# Patient Record
Sex: FEMALE | Race: WHITE | HISPANIC OR LATINO | Employment: UNEMPLOYED | URBAN - METROPOLITAN AREA
[De-identification: names, ages, dates, MRNs, and addresses within clinical notes are randomized per-mention and may not be internally consistent; named-entity substitution may affect disease eponyms.]

---

## 2023-11-06 ENCOUNTER — INITIAL PRENATAL (OUTPATIENT)
Dept: OBSTETRICS AND GYNECOLOGY | Facility: CLINIC | Age: 28
End: 2023-11-06
Payer: MEDICAID

## 2023-11-06 ENCOUNTER — LAB (OUTPATIENT)
Dept: LAB | Facility: LAB | Age: 28
End: 2023-11-06
Payer: MEDICAID

## 2023-11-06 VITALS — DIASTOLIC BLOOD PRESSURE: 66 MMHG | WEIGHT: 134.5 LBS | SYSTOLIC BLOOD PRESSURE: 110 MMHG

## 2023-11-06 DIAGNOSIS — Z3A.31 31 WEEKS GESTATION OF PREGNANCY (HHS-HCC): Primary | ICD-10-CM

## 2023-11-06 DIAGNOSIS — O99.013 ANEMIA DURING PREGNANCY IN THIRD TRIMESTER (HHS-HCC): ICD-10-CM

## 2023-11-06 DIAGNOSIS — O35.BXX0 ANOMALY OF HEART OF FETUS AFFECTING PREGNANCY, ANTEPARTUM, SINGLE OR UNSPECIFIED FETUS (HHS-HCC): ICD-10-CM

## 2023-11-06 DIAGNOSIS — O35.BXX1 ABNORMAL FETAL ECHOCARDIOGRAPHY AFFECTING ANTEPARTUM CARE OF MOTHER, FETUS 1 OF MULTIPLE GESTATION (HHS-HCC): ICD-10-CM

## 2023-11-06 DIAGNOSIS — K62.3 RECTAL PROLAPSE: ICD-10-CM

## 2023-11-06 PROBLEM — O09.91 SUPERVISION OF HIGH RISK PREGNANCY IN FIRST TRIMESTER (HHS-HCC): Status: ACTIVE | Noted: 2023-05-17

## 2023-11-06 PROBLEM — O09.91 SUPERVISION OF HIGH RISK PREGNANCY IN FIRST TRIMESTER (HHS-HCC): Status: RESOLVED | Noted: 2023-05-17 | Resolved: 2023-11-06

## 2023-11-06 LAB
ERYTHROCYTE [DISTWIDTH] IN BLOOD BY AUTOMATED COUNT: 13.4 % (ref 11.5–14.5)
HCT VFR BLD AUTO: 31.1 % (ref 36–46)
HGB BLD-MCNC: 10.6 G/DL (ref 12–16)
MCH RBC QN AUTO: 30.7 PG (ref 26–34)
MCHC RBC AUTO-ENTMCNC: 34.1 G/DL (ref 32–36)
MCV RBC AUTO: 90 FL (ref 80–100)
NRBC BLD-RTO: 0 /100 WBCS (ref 0–0)
PLATELET # BLD AUTO: 349 X10*3/UL (ref 150–450)
RBC # BLD AUTO: 3.45 X10*6/UL (ref 4–5.2)
REFLEX ADDED, ANEMIA PANEL: NORMAL
WBC # BLD AUTO: 9.7 X10*3/UL (ref 4.4–11.3)

## 2023-11-06 PROCEDURE — 90471 IMMUNIZATION ADMIN: CPT | Mod: GC | Performed by: STUDENT IN AN ORGANIZED HEALTH CARE EDUCATION/TRAINING PROGRAM

## 2023-11-06 PROCEDURE — 90715 TDAP VACCINE 7 YRS/> IM: CPT | Mod: GC | Performed by: STUDENT IN AN ORGANIZED HEALTH CARE EDUCATION/TRAINING PROGRAM

## 2023-11-06 PROCEDURE — 99204 OFFICE O/P NEW MOD 45 MIN: CPT | Performed by: STUDENT IN AN ORGANIZED HEALTH CARE EDUCATION/TRAINING PROGRAM

## 2023-11-06 PROCEDURE — 82728 ASSAY OF FERRITIN: CPT

## 2023-11-06 PROCEDURE — 83550 IRON BINDING TEST: CPT

## 2023-11-06 PROCEDURE — 36415 COLL VENOUS BLD VENIPUNCTURE: CPT

## 2023-11-06 PROCEDURE — 82746 ASSAY OF FOLIC ACID SERUM: CPT

## 2023-11-06 PROCEDURE — 85027 COMPLETE CBC AUTOMATED: CPT

## 2023-11-06 PROCEDURE — 99214 OFFICE O/P EST MOD 30 MIN: CPT | Mod: GC | Performed by: STUDENT IN AN ORGANIZED HEALTH CARE EDUCATION/TRAINING PROGRAM

## 2023-11-06 PROCEDURE — 82607 VITAMIN B-12: CPT

## 2023-11-06 ASSESSMENT — EDINBURGH POSTNATAL DEPRESSION SCALE (EPDS)
I HAVE FELT SAD OR MISERABLE: NO, NOT AT ALL
THINGS HAVE BEEN GETTING ON TOP OF ME: NO, I HAVE BEEN COPING AS WELL AS EVER
I HAVE BEEN ABLE TO LAUGH AND SEE THE FUNNY SIDE OF THINGS: AS MUCH AS I ALWAYS COULD
I HAVE BEEN SO UNHAPPY THAT I HAVE HAD DIFFICULTY SLEEPING: NOT AT ALL
I HAVE FELT SCARED OR PANICKY FOR NO GOOD REASON: NO, NOT AT ALL
THE THOUGHT OF HARMING MYSELF HAS OCCURRED TO ME: NEVER
I HAVE LOOKED FORWARD WITH ENJOYMENT TO THINGS: AS MUCH AS I EVER DID
TOTAL SCORE: 0
I HAVE BEEN SO UNHAPPY THAT I HAVE BEEN CRYING: NO, NEVER
I HAVE BLAMED MYSELF UNNECESSARILY WHEN THINGS WENT WRONG: NO, NEVER
I HAVE BEEN ANXIOUS OR WORRIED FOR NO GOOD REASON: NO, NOT AT ALL

## 2023-11-06 NOTE — PROGRESS NOTES
OB Follow-up  2023       SUBJECTIVE    HPI: Criss Roman is a 28 y.o.  at 29w6d here for RPNV. Denies contractions, bleeding, or LOF. Reports normal fetal movement. Patient reports feeling well. No complaints. Transfer of care from [facility].    OBJECTIVE    Visit Vitals  /66 Comment:    Wt 61 kg (134 lb 8 oz)   LMP 2023 (Exact Date)   OB Status Pregnant   Smoking Status Never      FHT: 139    ASSESSMENT & PLAN    Criss Romna is a 28 y.o.  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  - s/p fetal echo  - Triage code 0, no additional testing needed, no code pink at time of delivery    Anemia during pregnancy in third trimester  - hgb 10.4 on 10/11  - repeat cbc with anemia panel sent today     31 weeks gestation of pregnancy  - transfer of care from [facility]  - Doing well, no concerns  - TDaP today     Orders Placed This Encounter   Procedures    Tdap vaccine, age 7 years and older  (BOOSTRIX)    CBC Anemia Panel With Reflex, Pregnancy     Standing Status:   Future     Number of Occurrences:   1     Standing Expiration Date:   2024     Order Specific Question:   Release result to [employer] [facility]     Answer:   Immediate [1]      RTC in 2 weeks    Patient seen and evaluated with Dr. Ledy Chen MD, PGY-3

## 2023-11-06 NOTE — ASSESSMENT & PLAN NOTE
- transfer of care from [facility]  - Doing well, no concerns  - TDaP today

## 2023-11-06 NOTE — ASSESSMENT & PLAN NOTE
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

## 2023-11-06 NOTE — ASSESSMENT & PLAN NOTE
- pt reports history of rectal prolapse with subsequent surgery in [place]  - reports doctor recommended not to attempt a vaginal birth

## 2023-11-07 LAB
FERRITIN SERPL-MCNC: 18 NG/ML
FOLATE SERPL-MCNC: 19.3 NG/ML
IRON SATN MFR SERPL: NORMAL %
IRON SERPL-MCNC: 33 UG/DL
TIBC SERPL-MCNC: NORMAL UG/DL
UIBC SERPL-MCNC: >450 UG/DL
VIT B12 SERPL-MCNC: 222 PG/ML

## 2023-11-07 RX ORDER — FERROUS SULFATE 325(65) MG
65 TABLET ORAL
Qty: 30 TABLET | Refills: 11 | Status: SHIPPED | OUTPATIENT
Start: 2023-11-07 | End: 2024-01-19 | Stop reason: ALTCHOICE

## 2023-11-14 ENCOUNTER — ANCILLARY PROCEDURE (OUTPATIENT)
Dept: RADIOLOGY | Facility: CLINIC | Age: 28
End: 2023-11-14
Payer: MEDICAID

## 2023-11-14 DIAGNOSIS — Z36.82 ENCOUNTER FOR ANTENATAL SCREENING FOR NUCHAL TRANSLUCENCY (HHS-HCC): ICD-10-CM

## 2023-11-14 DIAGNOSIS — O35.BXX0: ICD-10-CM

## 2023-11-14 PROCEDURE — 76819 FETAL BIOPHYS PROFIL W/O NST: CPT | Performed by: OBSTETRICS & GYNECOLOGY

## 2023-11-14 PROCEDURE — 76811 OB US DETAILED SNGL FETUS: CPT | Performed by: OBSTETRICS & GYNECOLOGY

## 2023-11-14 PROCEDURE — 76811 OB US DETAILED SNGL FETUS: CPT

## 2023-11-14 PROCEDURE — 76819 FETAL BIOPHYS PROFIL W/O NST: CPT

## 2023-12-04 ENCOUNTER — TELEPHONE (OUTPATIENT)
Dept: OBSTETRICS AND GYNECOLOGY | Facility: CLINIC | Age: 28
End: 2023-12-04

## 2023-12-04 ENCOUNTER — PHARMACY VISIT (OUTPATIENT)
Dept: PHARMACY | Facility: CLINIC | Age: 28
End: 2023-12-04
Payer: MEDICAID

## 2023-12-04 ENCOUNTER — ROUTINE PRENATAL (OUTPATIENT)
Dept: OBSTETRICS AND GYNECOLOGY | Facility: CLINIC | Age: 28
End: 2023-12-04
Payer: MEDICAID

## 2023-12-04 ENCOUNTER — PREP FOR PROCEDURE (OUTPATIENT)
Dept: OBSTETRICS AND GYNECOLOGY | Facility: CLINIC | Age: 28
End: 2023-12-04

## 2023-12-04 ENCOUNTER — LAB (OUTPATIENT)
Dept: LAB | Facility: LAB | Age: 28
End: 2023-12-04
Payer: MEDICAID

## 2023-12-04 VITALS — HEART RATE: 108 BPM | DIASTOLIC BLOOD PRESSURE: 54 MMHG | WEIGHT: 142 LBS | SYSTOLIC BLOOD PRESSURE: 97 MMHG

## 2023-12-04 DIAGNOSIS — Z3A.33 33 WEEKS GESTATION OF PREGNANCY (HHS-HCC): ICD-10-CM

## 2023-12-04 DIAGNOSIS — O99.013 ANEMIA DURING PREGNANCY IN THIRD TRIMESTER (HHS-HCC): ICD-10-CM

## 2023-12-04 DIAGNOSIS — Z3A.34 34 WEEKS GESTATION OF PREGNANCY (HHS-HCC): ICD-10-CM

## 2023-12-04 DIAGNOSIS — K62.3 RECTAL PROLAPSE: ICD-10-CM

## 2023-12-04 DIAGNOSIS — O35.BXX0 ABNORMAL FETAL ECHOCARDIOGRAPHY AFFECTING ANTEPARTUM CARE OF MOTHER, SINGLE OR UNSPECIFIED FETUS (HHS-HCC): Primary | ICD-10-CM

## 2023-12-04 DIAGNOSIS — Z3A.39 39 WEEKS GESTATION OF PREGNANCY (HHS-HCC): Primary | ICD-10-CM

## 2023-12-04 DIAGNOSIS — G47.00 INSOMNIA, UNSPECIFIED TYPE: ICD-10-CM

## 2023-12-04 LAB
ERYTHROCYTE [DISTWIDTH] IN BLOOD BY AUTOMATED COUNT: 14.9 % (ref 11.5–14.5)
HCT VFR BLD AUTO: 32.2 % (ref 36–46)
HGB BLD-MCNC: 11.3 G/DL (ref 12–16)
HGB RETIC QN: 33 PG (ref 28–38)
IMMATURE RETIC FRACTION: 22.3 %
MCH RBC QN AUTO: 31.8 PG (ref 26–34)
MCHC RBC AUTO-ENTMCNC: 35.1 G/DL (ref 32–36)
MCV RBC AUTO: 91 FL (ref 80–100)
NRBC BLD-RTO: 0 /100 WBCS (ref 0–0)
PLATELET # BLD AUTO: 303 X10*3/UL (ref 150–450)
RBC # BLD AUTO: 3.55 X10*6/UL (ref 4–5.2)
RETICS #: 0.12 X10*6/UL (ref 0.02–0.08)
RETICS/RBC NFR AUTO: 3.4 % (ref 0.5–2)
T PALLIDUM AB SER QL: NONREACTIVE
WBC # BLD AUTO: 7 X10*3/UL (ref 4.4–11.3)

## 2023-12-04 PROCEDURE — 99214 OFFICE O/P EST MOD 30 MIN: CPT | Mod: GC,TH | Performed by: STUDENT IN AN ORGANIZED HEALTH CARE EDUCATION/TRAINING PROGRAM

## 2023-12-04 PROCEDURE — 36415 COLL VENOUS BLD VENIPUNCTURE: CPT

## 2023-12-04 PROCEDURE — RXMED WILLOW AMBULATORY MEDICATION CHARGE

## 2023-12-04 PROCEDURE — 99214 OFFICE O/P EST MOD 30 MIN: CPT | Performed by: STUDENT IN AN ORGANIZED HEALTH CARE EDUCATION/TRAINING PROGRAM

## 2023-12-04 PROCEDURE — 85045 AUTOMATED RETICULOCYTE COUNT: CPT

## 2023-12-04 PROCEDURE — 85027 COMPLETE CBC AUTOMATED: CPT

## 2023-12-04 PROCEDURE — 86780 TREPONEMA PALLIDUM: CPT

## 2023-12-04 RX ORDER — DIPHENHYDRAMINE HCL 25 MG
25 TABLET ORAL NIGHTLY PRN
Qty: 30 TABLET | Refills: 0 | Status: SHIPPED | OUTPATIENT
Start: 2023-12-04 | End: 2024-01-19 | Stop reason: ALTCHOICE

## 2023-12-04 RX ORDER — DIPHENHYDRAMINE HCL 25 MG
25 TABLET ORAL NIGHTLY PRN
Qty: 30 TABLET | Refills: 0 | Status: SHIPPED | OUTPATIENT
Start: 2023-12-04 | End: 2023-12-04 | Stop reason: SDUPTHER

## 2023-12-04 ASSESSMENT — PAIN - FUNCTIONAL ASSESSMENT: PAIN_FUNCTIONAL_ASSESSMENT: 0-10

## 2023-12-04 ASSESSMENT — PAIN SCALES - GENERAL: PAINLEVEL_OUTOF10: 0 - NO PAIN

## 2023-12-04 NOTE — TELEPHONE ENCOUNTER
C/Section per Dr Hutchinson at 39 weeks GA  Scheduled for 1/9/2024 0830  Arrive 0630  Patient notified  Discussed labs, NPO after midnight, visitor policy   Dr Hutchinson to put in order

## 2023-12-04 NOTE — TELEPHONE ENCOUNTER
----- Message from Ute Hutchinson MD sent at 2023 10:59 AM EST -----  Hi,    This patient needs to be scheduled for a 39 week repeat  and wants a breast pump prescription     Thank you!  Ute

## 2023-12-04 NOTE — PROGRESS NOTES
Ob Follow-up  23        SUBJECTIVE      HPI: Criss Roman is a 28 y.o.  at 33w6d here for RPNV.  She has no contractions, bleeding, or LOF. Reports normal fetal movement.        OBJECTIVE  Visit Vitals  BP 97/54   Pulse 108   Wt 64.4 kg (142 lb)   LMP 2023 (Exact Date)   OB Status Pregnant   Smoking Status Never      FHT: 143      ASSESSMENT & PLAN    Criss Roman is a 28 y.o.  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  - s/p fetal echo  - Triage code 0, no additional testing needed, no code pink at time of delivery         Anemia during pregnancy in third trimester    Overview     - : hgb 10.6, ferritin 18  - PO iron         Relevant Orders    CBC (Completed)    Reticulocytes (Completed)    Rectal prolapse    Overview     - pt reports history of rectal prolapse with subsequent surgery in [place]  - reports doctor recommended not to attempt a vaginal birth          Other Visit Diagnoses       Insomnia, unspecified type        Relevant Medications    diphenhydrAMINE (Sominex) 25 mg tablet              Orders Placed This Encounter   Procedures    CBC     Standing Status:   Future     Number of Occurrences:   1     Standing Expiration Date:   2024     Order Specific Question:   Release result to [employer]     Answer:   Immediate [1]    Reticulocytes     Standing Status:   Future     Number of Occurrences:   1      Standing Expiration Date:   12/4/2024     Order Specific Question:   Release result to MyChart     Answer:   Immediate [1]    Syphilis Screen with Reflex     Standing Status:   Future     Number of Occurrences:   1     Standing Expiration Date:   12/4/2024     Order Specific Question:   Release result to MyChart     Answer:   Immediate [1]        RTC in 2 weeks    Pt dw Dr Ledy Hutchinson MD

## 2023-12-19 ENCOUNTER — PHARMACY VISIT (OUTPATIENT)
Dept: PHARMACY | Facility: CLINIC | Age: 28
End: 2023-12-19
Payer: MEDICAID

## 2023-12-19 ENCOUNTER — ANCILLARY PROCEDURE (OUTPATIENT)
Dept: RADIOLOGY | Facility: CLINIC | Age: 28
End: 2023-12-19
Payer: MEDICAID

## 2023-12-19 ENCOUNTER — ROUTINE PRENATAL (OUTPATIENT)
Dept: OBSTETRICS AND GYNECOLOGY | Facility: CLINIC | Age: 28
End: 2023-12-19
Payer: MEDICAID

## 2023-12-19 VITALS — WEIGHT: 141.5 LBS | SYSTOLIC BLOOD PRESSURE: 109 MMHG | DIASTOLIC BLOOD PRESSURE: 67 MMHG

## 2023-12-19 DIAGNOSIS — O09.43 HIGH RISK MULTIGRAVIDA IN THIRD TRIMESTER (HHS-HCC): ICD-10-CM

## 2023-12-19 DIAGNOSIS — Z3A.36 36 WEEKS GESTATION OF PREGNANCY (HHS-HCC): ICD-10-CM

## 2023-12-19 DIAGNOSIS — O99.013 ANEMIA DURING PREGNANCY IN THIRD TRIMESTER (HHS-HCC): ICD-10-CM

## 2023-12-19 DIAGNOSIS — B37.31 CANDIDAL VULVITIS: ICD-10-CM

## 2023-12-19 DIAGNOSIS — K62.3 RECTAL PROLAPSE: ICD-10-CM

## 2023-12-19 DIAGNOSIS — Z03.74 ENCOUNTER FOR SUSPECTED PROBLEM WITH FETAL GROWTH RULED OUT: ICD-10-CM

## 2023-12-19 DIAGNOSIS — Z3A.36 36 WEEKS GESTATION OF PREGNANCY (HHS-HCC): Primary | ICD-10-CM

## 2023-12-19 PROCEDURE — RXMED WILLOW AMBULATORY MEDICATION CHARGE

## 2023-12-19 PROCEDURE — 76816 OB US FOLLOW-UP PER FETUS: CPT

## 2023-12-19 PROCEDURE — RXOTC WILLOW AMBULATORY OTC CHARGE

## 2023-12-19 PROCEDURE — 99214 OFFICE O/P EST MOD 30 MIN: CPT | Mod: GC,TH

## 2023-12-19 PROCEDURE — 87081 CULTURE SCREEN ONLY: CPT

## 2023-12-19 PROCEDURE — 76819 FETAL BIOPHYS PROFIL W/O NST: CPT | Performed by: OBSTETRICS & GYNECOLOGY

## 2023-12-19 PROCEDURE — 76816 OB US FOLLOW-UP PER FETUS: CPT | Performed by: OBSTETRICS & GYNECOLOGY

## 2023-12-19 PROCEDURE — 99214 OFFICE O/P EST MOD 30 MIN: CPT

## 2023-12-19 RX ORDER — NYSTATIN AND TRIAMCINOLONE ACETONIDE 100000; 1 [USP'U]/G; MG/G
OINTMENT TOPICAL 2 TIMES DAILY
Qty: 15 G | Refills: 0 | Status: SHIPPED | OUTPATIENT
Start: 2023-12-19 | End: 2023-12-25 | Stop reason: SDUPTHER

## 2023-12-19 NOTE — PROGRESS NOTES
Ob Follow-up  23     SUBJECTIVE      HPI: Criss Roman is a 28 y.o.  at 36w0d here for RPNV.  She has no contractions, no bleeding, or no LOF. Reports normal fetal movement. Patient reports a rash in her groin. Noticed it 1 week ago. Has not tried anything       OBJECTIVE  Visit Vitals  /67   Wt 64.2 kg (141 lb 8 oz)   LMP 2023 (Exact Date)   OB Status Pregnant   Smoking Status Never      FHT: US today  FH: 33   exam: inflammed vulva with erythema extending toward inguinal folds. Thin white vaginal discharge      ASSESSMENT & PLAN    Criss Roman is a 28 y.o.  at 36w0d here for the following concerns we addressed today:    Problem List Items Addressed This Visit          Pregnancy    36 weeks gestation of pregnancy - Primary    Overview     [x] datinwk US (not c/w LMP)  Baby boy Zabier  [x] Anatomic survey at 19-20w  [X] Third trimester labs (Syphilis, 1hr, CBC)  [ ] Immunizations: [ ] Flu [x] TDAP [ ] RSV [ ] Covid - declined flu/covid   [x] Contraception plan POPs  [x] feeding breast and bottle   [ ] 35w GBS - collected   [x] del plan - for pCS in s/o rectal prolapse with surgery, scheduled          Current Assessment & Plan     Up to date. GBS collected today.  Growth US today         Relevant Orders    Group B Streptococcus (GBS) Prenatal Screen, Culture    Anemia during pregnancy in third trimester    Overview     - : hgb 10.6, ferritin 18  - PO iron         Current Assessment & Plan     Doing well with PO iron.         Candidal vulvitis    Overview     Itching and redness in groin for 1 week.         Current Assessment & Plan     Vulvar inflammation, erythema and thin white vaginal discharge, consistent with yeast vulvitis  Nystatin-triamcinialone ointment Rxed         Relevant Medications    nystatin-triamcinolone (Mycolog II) ointment    High risk multigravida in third trimester    Rectal prolapse    Overview     - pt reports history of rectal prolapse  with subsequent surgery in [place]  - reports doctor recommended not to attempt a vaginal birth              Orders Placed This Encounter   Procedures    Group B Streptococcus (GBS) Prenatal Screen, Culture     If PCN-allergic, please send sensitivities     Order Specific Question:   [facility]     Answer:   Immediate [1]        RTC in 1 week  Seen and discussed with dr. Mac Anaya MD

## 2023-12-19 NOTE — ASSESSMENT & PLAN NOTE
Vulvar inflammation, erythema and thin white vaginal discharge, consistent with yeast vulvitis  Nystatin-triamcinialone ointment Rxed

## 2023-12-23 LAB — GP B STREP GENITAL QL CULT: NORMAL

## 2023-12-25 DIAGNOSIS — B37.31 CANDIDAL VULVITIS: ICD-10-CM

## 2023-12-26 RX ORDER — NYSTATIN AND TRIAMCINOLONE ACETONIDE 100000; 1 [USP'U]/G; MG/G
OINTMENT TOPICAL 2 TIMES DAILY
Qty: 15 G | Refills: 0 | Status: SHIPPED | OUTPATIENT
Start: 2023-12-26 | End: 2024-01-19 | Stop reason: ALTCHOICE

## 2023-12-28 ENCOUNTER — ROUTINE PRENATAL (OUTPATIENT)
Dept: OBSTETRICS AND GYNECOLOGY | Facility: CLINIC | Age: 28
End: 2023-12-28
Payer: MEDICAID

## 2023-12-28 VITALS — WEIGHT: 143.6 LBS | SYSTOLIC BLOOD PRESSURE: 105 MMHG | DIASTOLIC BLOOD PRESSURE: 63 MMHG

## 2023-12-28 DIAGNOSIS — Z3A.36 36 WEEKS GESTATION OF PREGNANCY (HHS-HCC): ICD-10-CM

## 2023-12-28 DIAGNOSIS — O99.013 ANEMIA DURING PREGNANCY IN THIRD TRIMESTER (HHS-HCC): ICD-10-CM

## 2023-12-28 DIAGNOSIS — K62.3 RECTAL PROLAPSE: ICD-10-CM

## 2023-12-28 DIAGNOSIS — O09.43 HIGH RISK MULTIGRAVIDA IN THIRD TRIMESTER (HHS-HCC): ICD-10-CM

## 2023-12-28 DIAGNOSIS — B37.31 CANDIDAL VULVITIS: ICD-10-CM

## 2023-12-28 PROCEDURE — 99213 OFFICE O/P EST LOW 20 MIN: CPT | Performed by: OBSTETRICS & GYNECOLOGY

## 2023-12-28 PROCEDURE — 99213 OFFICE O/P EST LOW 20 MIN: CPT | Mod: TH | Performed by: OBSTETRICS & GYNECOLOGY

## 2023-12-28 RX ORDER — PYRIDOXINE HCL (VITAMIN B6) 25 MG
25 TABLET ORAL
COMMUNITY
Start: 2023-06-15 | End: 2024-01-03 | Stop reason: HOSPADM

## 2023-12-28 NOTE — PROGRESS NOTES
"Subjective   Patient ID [id number]   Criss Roman is a 28 y.o.  at 37w2d with a working estimated date of delivery of 2024, by Ultrasound who presents for a routine prenatal visit. She denies vaginal bleeding, leakage of fluid, decreased fetal movements, or contractions.    Her pregnancy is complicated by:  Rectal prolapse    Objective   Physical Exam:   Weight: 65.1 kg (143 lb 9.6 oz)  Expected Total Weight Gain: Could not be calculated   Pregravid BMI: Could not be calculated  BP: 105/63  Fetal Heart Rate: 155 Fundal Height (cm): 36 cm             Prenatal Labs  Urine Dip:  No results found for: \"KETONESU\", \"GLUCOSEUR\", \"LEUKOCYTESUR\"  Lab Results   Component Value Date    HGB 11.3 (L) 2023    HCT 32.2 (L) 2023     No results found for: \"PAPPA\", \"AFP\", \"HCG\", \"ESTRIOL\", \"INHBA\"  No results found for: \"GLUF\", \"GLUT1\", \"YEZCNFC7XA\", \"NFLHBGX2FQ\"    Imaging  The most recent ultrasound was performed on   with a study GA of   and EFW of  .          Assessment/Plan   Diagnoses and all orders for this visit:  36 weeks gestation of pregnancy  Anemia during pregnancy in third trimester  Rectal prolapse  High risk multigravida in third trimester  Candidal vulvitis    Continue prenatal vitamin.  Labs reviewed.  GBS taken.  Expected mode of delivery C/S  Follow up in 1 week for a routine prenatal visit.  "

## 2023-12-29 ENCOUNTER — PHARMACY VISIT (OUTPATIENT)
Dept: PHARMACY | Facility: CLINIC | Age: 28
End: 2023-12-29
Payer: MEDICAID

## 2023-12-29 PROCEDURE — RXMED WILLOW AMBULATORY MEDICATION CHARGE

## 2023-12-31 ENCOUNTER — HOSPITAL ENCOUNTER (INPATIENT)
Facility: HOSPITAL | Age: 28
LOS: 3 days | Discharge: HOME | End: 2024-01-03
Attending: STUDENT IN AN ORGANIZED HEALTH CARE EDUCATION/TRAINING PROGRAM | Admitting: STUDENT IN AN ORGANIZED HEALTH CARE EDUCATION/TRAINING PROGRAM
Payer: MEDICAID

## 2023-12-31 ENCOUNTER — ANESTHESIA (OUTPATIENT)
Dept: OBSTETRICS AND GYNECOLOGY | Facility: HOSPITAL | Age: 28
End: 2023-12-31
Payer: MEDICAID

## 2023-12-31 ENCOUNTER — ANESTHESIA EVENT (OUTPATIENT)
Dept: OBSTETRICS AND GYNECOLOGY | Facility: HOSPITAL | Age: 28
End: 2023-12-31
Payer: MEDICAID

## 2023-12-31 DIAGNOSIS — K62.3 RECTAL PROLAPSE: Primary | ICD-10-CM

## 2023-12-31 DIAGNOSIS — Z30.011 ENCOUNTER FOR INITIAL PRESCRIPTION OF CONTRACEPTIVE PILLS: ICD-10-CM

## 2023-12-31 LAB
ABO GROUP (TYPE) IN BLOOD: NORMAL
ABO GROUP (TYPE) IN BLOOD: NORMAL
ALBUMIN SERPL BCP-MCNC: 3.1 G/DL (ref 3.4–5)
ANION GAP SERPL CALC-SCNC: 12 MMOL/L (ref 10–20)
ANTIBODY SCREEN: NORMAL
APTT PPP: 29 SECONDS (ref 27–38)
BLOOD EXPIRATION DATE: NORMAL
BUN SERPL-MCNC: 4 MG/DL (ref 6–23)
CALCIUM SERPL-MCNC: 8.4 MG/DL (ref 8.6–10.6)
CHLORIDE SERPL-SCNC: 103 MMOL/L (ref 98–107)
CO2 SERPL-SCNC: 26 MMOL/L (ref 21–32)
CREAT SERPL-MCNC: 0.45 MG/DL (ref 0.5–1.05)
DISPENSE STATUS: NORMAL
ERYTHROCYTE [DISTWIDTH] IN BLOOD BY AUTOMATED COUNT: 14.5 % (ref 11.5–14.5)
ERYTHROCYTE [DISTWIDTH] IN BLOOD BY AUTOMATED COUNT: 14.6 % (ref 11.5–14.5)
ERYTHROCYTE [DISTWIDTH] IN BLOOD BY AUTOMATED COUNT: 14.7 % (ref 11.5–14.5)
FIBRINOGEN PPP-MCNC: 338 MG/DL (ref 200–400)
GFR SERPL CREATININE-BSD FRML MDRD: >90 ML/MIN/1.73M*2
GLUCOSE SERPL-MCNC: 88 MG/DL (ref 74–99)
HCT VFR BLD AUTO: 28.4 % (ref 36–46)
HCT VFR BLD AUTO: 30.6 % (ref 36–46)
HCT VFR BLD AUTO: 35.3 % (ref 36–46)
HGB BLD-MCNC: 10 G/DL (ref 12–16)
HGB BLD-MCNC: 10.2 G/DL (ref 12–16)
HGB BLD-MCNC: 12.3 G/DL (ref 12–16)
INR PPP: 1.1 (ref 0.9–1.1)
MCH RBC QN AUTO: 30.3 PG (ref 26–34)
MCH RBC QN AUTO: 30.6 PG (ref 26–34)
MCH RBC QN AUTO: 31.3 PG (ref 26–34)
MCHC RBC AUTO-ENTMCNC: 33.3 G/DL (ref 32–36)
MCHC RBC AUTO-ENTMCNC: 34.8 G/DL (ref 32–36)
MCHC RBC AUTO-ENTMCNC: 35.2 G/DL (ref 32–36)
MCV RBC AUTO: 88 FL (ref 80–100)
MCV RBC AUTO: 89 FL (ref 80–100)
MCV RBC AUTO: 91 FL (ref 80–100)
NRBC BLD-RTO: 0 /100 WBCS (ref 0–0)
PHOSPHATE SERPL-MCNC: 3.1 MG/DL (ref 2.5–4.9)
PLATELET # BLD AUTO: 215 X10*3/UL (ref 150–450)
PLATELET # BLD AUTO: 266 X10*3/UL (ref 150–450)
PLATELET # BLD AUTO: 283 X10*3/UL (ref 150–450)
POTASSIUM SERPL-SCNC: 4.1 MMOL/L (ref 3.5–5.3)
PRODUCT BLOOD TYPE: 5100
PRODUCT CODE: NORMAL
PROTHROMBIN TIME: 12 SECONDS (ref 9.8–12.8)
RBC # BLD AUTO: 3.19 X10*6/UL (ref 4–5.2)
RBC # BLD AUTO: 3.37 X10*6/UL (ref 4–5.2)
RBC # BLD AUTO: 4.02 X10*6/UL (ref 4–5.2)
RH FACTOR (ANTIGEN D): NORMAL
RH FACTOR (ANTIGEN D): NORMAL
SODIUM SERPL-SCNC: 137 MMOL/L (ref 136–145)
T PALLIDUM AB SER QL: NONREACTIVE
UNIT ABO: NORMAL
UNIT NUMBER: NORMAL
UNIT RH: NORMAL
UNIT VOLUME: 350
WBC # BLD AUTO: 12.2 X10*3/UL (ref 4.4–11.3)
WBC # BLD AUTO: 13.3 X10*3/UL (ref 4.4–11.3)
WBC # BLD AUTO: 9 X10*3/UL (ref 4.4–11.3)
XM INTEP: NORMAL

## 2023-12-31 PROCEDURE — 7100000016 HC LABOR RECOVERY PER HOUR: Performed by: STUDENT IN AN ORGANIZED HEALTH CARE EDUCATION/TRAINING PROGRAM

## 2023-12-31 PROCEDURE — 59514 CESAREAN DELIVERY ONLY: CPT | Performed by: STUDENT IN AN ORGANIZED HEALTH CARE EDUCATION/TRAINING PROGRAM

## 2023-12-31 PROCEDURE — 3700000014 HC AN EPIDURAL BLOCK CHARGE: Performed by: STUDENT IN AN ORGANIZED HEALTH CARE EDUCATION/TRAINING PROGRAM

## 2023-12-31 PROCEDURE — 01961 ANES CESAREAN DELIVERY ONLY: CPT | Performed by: ANESTHESIOLOGY

## 2023-12-31 PROCEDURE — 2500000004 HC RX 250 GENERAL PHARMACY W/ HCPCS (ALT 636 FOR OP/ED): Performed by: STUDENT IN AN ORGANIZED HEALTH CARE EDUCATION/TRAINING PROGRAM

## 2023-12-31 PROCEDURE — 2500000005 HC RX 250 GENERAL PHARMACY W/O HCPCS: Performed by: STUDENT IN AN ORGANIZED HEALTH CARE EDUCATION/TRAINING PROGRAM

## 2023-12-31 PROCEDURE — P9045 ALBUMIN (HUMAN), 5%, 250 ML: HCPCS | Mod: JZ | Performed by: STUDENT IN AN ORGANIZED HEALTH CARE EDUCATION/TRAINING PROGRAM

## 2023-12-31 PROCEDURE — 2500000004 HC RX 250 GENERAL PHARMACY W/ HCPCS (ALT 636 FOR OP/ED)

## 2023-12-31 PROCEDURE — 3700000018 HC OB ANESTHESIA C-SECTION: Performed by: STUDENT IN AN ORGANIZED HEALTH CARE EDUCATION/TRAINING PROGRAM

## 2023-12-31 PROCEDURE — 1100000001 HC PRIVATE ROOM DAILY

## 2023-12-31 PROCEDURE — 85384 FIBRINOGEN ACTIVITY: CPT

## 2023-12-31 PROCEDURE — 36430 TRANSFUSION BLD/BLD COMPNT: CPT

## 2023-12-31 PROCEDURE — 36415 COLL VENOUS BLD VENIPUNCTURE: CPT | Performed by: STUDENT IN AN ORGANIZED HEALTH CARE EDUCATION/TRAINING PROGRAM

## 2023-12-31 PROCEDURE — 86780 TREPONEMA PALLIDUM: CPT | Performed by: STUDENT IN AN ORGANIZED HEALTH CARE EDUCATION/TRAINING PROGRAM

## 2023-12-31 PROCEDURE — 80069 RENAL FUNCTION PANEL: CPT | Performed by: STUDENT IN AN ORGANIZED HEALTH CARE EDUCATION/TRAINING PROGRAM

## 2023-12-31 PROCEDURE — 86901 BLOOD TYPING SEROLOGIC RH(D): CPT | Performed by: STUDENT IN AN ORGANIZED HEALTH CARE EDUCATION/TRAINING PROGRAM

## 2023-12-31 PROCEDURE — 86920 COMPATIBILITY TEST SPIN: CPT

## 2023-12-31 PROCEDURE — 36415 COLL VENOUS BLD VENIPUNCTURE: CPT

## 2023-12-31 PROCEDURE — 85610 PROTHROMBIN TIME: CPT

## 2023-12-31 PROCEDURE — 2720000007 HC OR 272 NO HCPCS: Performed by: STUDENT IN AN ORGANIZED HEALTH CARE EDUCATION/TRAINING PROGRAM

## 2023-12-31 PROCEDURE — P9016 RBC LEUKOCYTES REDUCED: HCPCS

## 2023-12-31 PROCEDURE — 85027 COMPLETE CBC AUTOMATED: CPT

## 2023-12-31 PROCEDURE — 85027 COMPLETE CBC AUTOMATED: CPT | Performed by: STUDENT IN AN ORGANIZED HEALTH CARE EDUCATION/TRAINING PROGRAM

## 2023-12-31 RX ORDER — OXYTOCIN/0.9 % SODIUM CHLORIDE 30/500 ML
60 PLASTIC BAG, INJECTION (ML) INTRAVENOUS ONCE AS NEEDED
Status: DISCONTINUED | OUTPATIENT
Start: 2023-12-31 | End: 2023-12-31

## 2023-12-31 RX ORDER — HYDROMORPHONE HYDROCHLORIDE 1 MG/ML
0.2 INJECTION, SOLUTION INTRAMUSCULAR; INTRAVENOUS; SUBCUTANEOUS EVERY 5 MIN PRN
Status: CANCELLED | OUTPATIENT
Start: 2023-12-31

## 2023-12-31 RX ORDER — SODIUM CHLORIDE, SODIUM LACTATE, POTASSIUM CHLORIDE, CALCIUM CHLORIDE 600; 310; 30; 20 MG/100ML; MG/100ML; MG/100ML; MG/100ML
125 INJECTION, SOLUTION INTRAVENOUS CONTINUOUS
Status: DISCONTINUED | OUTPATIENT
Start: 2023-12-31 | End: 2023-12-31

## 2023-12-31 RX ORDER — ADHESIVE BANDAGE
10 BANDAGE TOPICAL
Status: DISCONTINUED | OUTPATIENT
Start: 2023-12-31 | End: 2024-01-03 | Stop reason: HOSPADM

## 2023-12-31 RX ORDER — OXYTOCIN/0.9 % SODIUM CHLORIDE 30/500 ML
60 PLASTIC BAG, INJECTION (ML) INTRAVENOUS ONCE AS NEEDED
Status: DISCONTINUED | OUTPATIENT
Start: 2023-12-31 | End: 2024-01-03 | Stop reason: HOSPADM

## 2023-12-31 RX ORDER — TRANEXAMIC ACID 100 MG/ML
1000 INJECTION, SOLUTION INTRAVENOUS ONCE AS NEEDED
Status: DISCONTINUED | OUTPATIENT
Start: 2023-12-31 | End: 2023-12-31

## 2023-12-31 RX ORDER — KETOROLAC TROMETHAMINE 30 MG/ML
30 INJECTION, SOLUTION INTRAMUSCULAR; INTRAVENOUS EVERY 6 HOURS
Status: COMPLETED | OUTPATIENT
Start: 2023-12-31 | End: 2024-01-01

## 2023-12-31 RX ORDER — AZITHROMYCIN 100 MG/ML
INJECTION, POWDER, LYOPHILIZED, FOR SOLUTION INTRAVENOUS AS NEEDED
Status: DISCONTINUED | OUTPATIENT
Start: 2023-12-31 | End: 2023-12-31

## 2023-12-31 RX ORDER — DIPHENHYDRAMINE HCL 25 MG
25 CAPSULE ORAL EVERY 4 HOURS PRN
Status: DISCONTINUED | OUTPATIENT
Start: 2023-12-31 | End: 2024-01-03 | Stop reason: HOSPADM

## 2023-12-31 RX ORDER — MORPHINE SULFATE 2 MG/ML
INJECTION, SOLUTION INTRAMUSCULAR; INTRAVENOUS AS NEEDED
Status: DISCONTINUED | OUTPATIENT
Start: 2023-12-31 | End: 2023-12-31

## 2023-12-31 RX ORDER — HYDROMORPHONE HYDROCHLORIDE 1 MG/ML
0.2 INJECTION, SOLUTION INTRAMUSCULAR; INTRAVENOUS; SUBCUTANEOUS EVERY 5 MIN PRN
Status: DISCONTINUED | OUTPATIENT
Start: 2023-12-31 | End: 2024-01-03 | Stop reason: HOSPADM

## 2023-12-31 RX ORDER — MORPHINE SULFATE 0.5 MG/ML
INJECTION, SOLUTION EPIDURAL; INTRATHECAL; INTRAVENOUS AS NEEDED
Status: DISCONTINUED | OUTPATIENT
Start: 2023-12-31 | End: 2023-12-31

## 2023-12-31 RX ORDER — MISOPROSTOL 200 UG/1
800 TABLET ORAL ONCE AS NEEDED
Status: DISCONTINUED | OUTPATIENT
Start: 2023-12-31 | End: 2023-12-31

## 2023-12-31 RX ORDER — OXYTOCIN 10 [USP'U]/ML
10 INJECTION, SOLUTION INTRAMUSCULAR; INTRAVENOUS ONCE AS NEEDED
Status: DISCONTINUED | OUTPATIENT
Start: 2023-12-31 | End: 2023-12-31

## 2023-12-31 RX ORDER — LIDOCAINE 560 MG/1
1 PATCH PERCUTANEOUS; TOPICAL; TRANSDERMAL
Status: DISCONTINUED | OUTPATIENT
Start: 2023-12-31 | End: 2024-01-03 | Stop reason: HOSPADM

## 2023-12-31 RX ORDER — CEFAZOLIN 1 G/1
INJECTION, POWDER, FOR SOLUTION INTRAVENOUS AS NEEDED
Status: DISCONTINUED | OUTPATIENT
Start: 2023-12-31 | End: 2023-12-31

## 2023-12-31 RX ORDER — METOCLOPRAMIDE 10 MG/1
10 TABLET ORAL EVERY 6 HOURS PRN
Status: DISCONTINUED | OUTPATIENT
Start: 2023-12-31 | End: 2023-12-31

## 2023-12-31 RX ORDER — HYDRALAZINE HYDROCHLORIDE 20 MG/ML
5 INJECTION INTRAMUSCULAR; INTRAVENOUS ONCE AS NEEDED
Status: DISCONTINUED | OUTPATIENT
Start: 2023-12-31 | End: 2023-12-31

## 2023-12-31 RX ORDER — ONDANSETRON HYDROCHLORIDE 2 MG/ML
4 INJECTION, SOLUTION INTRAVENOUS EVERY 6 HOURS PRN
Status: DISCONTINUED | OUTPATIENT
Start: 2023-12-31 | End: 2023-12-31

## 2023-12-31 RX ORDER — PHENYLEPHRINE 10 MG/250 ML(40 MCG/ML)IN 0.9 % SOD.CHLORIDE INTRAVENOUS
CONTINUOUS PRN
Status: DISCONTINUED | OUTPATIENT
Start: 2023-12-31 | End: 2023-12-31

## 2023-12-31 RX ORDER — MEDROXYPROGESTERONE ACETATE 150 MG/ML
150 INJECTION, SUSPENSION INTRAMUSCULAR ONCE AS NEEDED
Status: DISCONTINUED | OUTPATIENT
Start: 2023-12-31 | End: 2024-01-03 | Stop reason: HOSPADM

## 2023-12-31 RX ORDER — ACETAMINOPHEN 325 MG/1
975 TABLET ORAL EVERY 6 HOURS SCHEDULED
Status: DISCONTINUED | OUTPATIENT
Start: 2023-12-31 | End: 2024-01-03 | Stop reason: HOSPADM

## 2023-12-31 RX ORDER — NALBUPHINE HYDROCHLORIDE 10 MG/ML
5 INJECTION, SOLUTION INTRAMUSCULAR; INTRAVENOUS; SUBCUTANEOUS
Status: DISPENSED | OUTPATIENT
Start: 2023-12-31 | End: 2024-01-01

## 2023-12-31 RX ORDER — LIDOCAINE HYDROCHLORIDE 10 MG/ML
30 INJECTION INFILTRATION; PERINEURAL ONCE AS NEEDED
Status: DISCONTINUED | OUTPATIENT
Start: 2023-12-31 | End: 2023-12-31

## 2023-12-31 RX ORDER — PHENYLEPHRINE HCL IN 0.9% NACL 0.4MG/10ML
SYRINGE (ML) INTRAVENOUS AS NEEDED
Status: DISCONTINUED | OUTPATIENT
Start: 2023-12-31 | End: 2023-12-31

## 2023-12-31 RX ORDER — OXYCODONE HYDROCHLORIDE 5 MG/1
10 TABLET ORAL EVERY 4 HOURS PRN
Status: DISCONTINUED | OUTPATIENT
Start: 2024-01-01 | End: 2024-01-03 | Stop reason: HOSPADM

## 2023-12-31 RX ORDER — POLYETHYLENE GLYCOL 3350 17 G/17G
17 POWDER, FOR SOLUTION ORAL 2 TIMES DAILY PRN
Status: DISCONTINUED | OUTPATIENT
Start: 2023-12-31 | End: 2024-01-03 | Stop reason: HOSPADM

## 2023-12-31 RX ORDER — METHYLERGONOVINE MALEATE 0.2 MG/ML
0.2 INJECTION INTRAVENOUS ONCE AS NEEDED
Status: DISCONTINUED | OUTPATIENT
Start: 2023-12-31 | End: 2023-12-31

## 2023-12-31 RX ORDER — LOPERAMIDE HYDROCHLORIDE 2 MG/1
4 CAPSULE ORAL EVERY 2 HOUR PRN
Status: DISCONTINUED | OUTPATIENT
Start: 2023-12-31 | End: 2023-12-31

## 2023-12-31 RX ORDER — ONDANSETRON 4 MG/1
4 TABLET, FILM COATED ORAL EVERY 6 HOURS PRN
Status: DISCONTINUED | OUTPATIENT
Start: 2023-12-31 | End: 2023-12-31

## 2023-12-31 RX ORDER — NIFEDIPINE 10 MG/1
10 CAPSULE ORAL ONCE AS NEEDED
Status: DISCONTINUED | OUTPATIENT
Start: 2023-12-31 | End: 2023-12-31

## 2023-12-31 RX ORDER — DIPHENHYDRAMINE HYDROCHLORIDE 50 MG/ML
25 INJECTION INTRAMUSCULAR; INTRAVENOUS EVERY 4 HOURS PRN
Status: DISCONTINUED | OUTPATIENT
Start: 2023-12-31 | End: 2024-01-03 | Stop reason: HOSPADM

## 2023-12-31 RX ORDER — OXYCODONE HYDROCHLORIDE 5 MG/1
5 TABLET ORAL EVERY 4 HOURS PRN
Status: DISCONTINUED | OUTPATIENT
Start: 2024-01-01 | End: 2024-01-03 | Stop reason: HOSPADM

## 2023-12-31 RX ORDER — METOCLOPRAMIDE HYDROCHLORIDE 5 MG/ML
10 INJECTION INTRAMUSCULAR; INTRAVENOUS EVERY 6 HOURS PRN
Status: DISCONTINUED | OUTPATIENT
Start: 2023-12-31 | End: 2023-12-31

## 2023-12-31 RX ORDER — TERBUTALINE SULFATE 1 MG/ML
0.25 INJECTION SUBCUTANEOUS ONCE AS NEEDED
Status: DISCONTINUED | OUTPATIENT
Start: 2023-12-31 | End: 2023-12-31

## 2023-12-31 RX ORDER — OXYTOCIN 10 [USP'U]/ML
10 INJECTION, SOLUTION INTRAMUSCULAR; INTRAVENOUS ONCE AS NEEDED
Status: DISCONTINUED | OUTPATIENT
Start: 2023-12-31 | End: 2024-01-03 | Stop reason: HOSPADM

## 2023-12-31 RX ORDER — KETOROLAC TROMETHAMINE 30 MG/ML
INJECTION, SOLUTION INTRAMUSCULAR; INTRAVENOUS AS NEEDED
Status: DISCONTINUED | OUTPATIENT
Start: 2023-12-31 | End: 2023-12-31

## 2023-12-31 RX ORDER — SIMETHICONE 80 MG
80 TABLET,CHEWABLE ORAL 4 TIMES DAILY PRN
Status: DISCONTINUED | OUTPATIENT
Start: 2023-12-31 | End: 2024-01-03 | Stop reason: HOSPADM

## 2023-12-31 RX ORDER — FENTANYL CITRATE 50 UG/ML
INJECTION, SOLUTION INTRAMUSCULAR; INTRAVENOUS AS NEEDED
Status: DISCONTINUED | OUTPATIENT
Start: 2023-12-31 | End: 2023-12-31

## 2023-12-31 RX ORDER — CARBOPROST TROMETHAMINE 250 UG/ML
250 INJECTION, SOLUTION INTRAMUSCULAR ONCE AS NEEDED
Status: DISCONTINUED | OUTPATIENT
Start: 2023-12-31 | End: 2023-12-31

## 2023-12-31 RX ORDER — FAMOTIDINE 10 MG/ML
INJECTION INTRAVENOUS AS NEEDED
Status: DISCONTINUED | OUTPATIENT
Start: 2023-12-31 | End: 2023-12-31

## 2023-12-31 RX ORDER — IBUPROFEN 600 MG/1
600 TABLET ORAL EVERY 6 HOURS
Status: DISCONTINUED | OUTPATIENT
Start: 2024-01-01 | End: 2024-01-03 | Stop reason: HOSPADM

## 2023-12-31 RX ORDER — NALOXONE HYDROCHLORIDE 0.4 MG/ML
0.1 INJECTION, SOLUTION INTRAMUSCULAR; INTRAVENOUS; SUBCUTANEOUS EVERY 5 MIN PRN
Status: DISCONTINUED | OUTPATIENT
Start: 2023-12-31 | End: 2024-01-03 | Stop reason: HOSPADM

## 2023-12-31 RX ORDER — BISACODYL 10 MG/1
10 SUPPOSITORY RECTAL DAILY PRN
Status: DISCONTINUED | OUTPATIENT
Start: 2023-12-31 | End: 2024-01-03 | Stop reason: HOSPADM

## 2023-12-31 RX ORDER — ALBUMIN HUMAN 50 G/1000ML
SOLUTION INTRAVENOUS AS NEEDED
Status: DISCONTINUED | OUTPATIENT
Start: 2023-12-31 | End: 2023-12-31

## 2023-12-31 RX ORDER — LABETALOL HYDROCHLORIDE 5 MG/ML
20 INJECTION, SOLUTION INTRAVENOUS ONCE AS NEEDED
Status: DISCONTINUED | OUTPATIENT
Start: 2023-12-31 | End: 2023-12-31

## 2023-12-31 RX ORDER — LIDOCAINE HCL/EPINEPHRINE/PF 2%-1:200K
VIAL (ML) INJECTION AS NEEDED
Status: DISCONTINUED | OUTPATIENT
Start: 2023-12-31 | End: 2023-12-31

## 2023-12-31 RX ADMIN — SODIUM CHLORIDE, POTASSIUM CHLORIDE, SODIUM LACTATE AND CALCIUM CHLORIDE 500 ML: 600; 310; 30; 20 INJECTION, SOLUTION INTRAVENOUS at 13:20

## 2023-12-31 RX ADMIN — ONDANSETRON 4 MG: 2 INJECTION INTRAMUSCULAR; INTRAVENOUS at 09:18

## 2023-12-31 RX ADMIN — ONDANSETRON 4 MG: 2 INJECTION INTRAMUSCULAR; INTRAVENOUS at 17:00

## 2023-12-31 RX ADMIN — Medication 0.78 MCG/KG/MIN: at 09:39

## 2023-12-31 RX ADMIN — DIPHENHYDRAMINE HYDROCHLORIDE 25 MG: 50 INJECTION INTRAMUSCULAR; INTRAVENOUS at 17:24

## 2023-12-31 RX ADMIN — MORPHINE SULFATE 3 MG: 0.5 INJECTION EPIDURAL; INTRATHECAL; INTRAVENOUS at 10:21

## 2023-12-31 RX ADMIN — MORPHINE SULFATE 1 MG: 2 INJECTION, SOLUTION INTRAMUSCULAR; INTRAVENOUS at 10:26

## 2023-12-31 RX ADMIN — SODIUM CHLORIDE, POTASSIUM CHLORIDE, SODIUM LACTATE AND CALCIUM CHLORIDE 125 ML/HR: 600; 310; 30; 20 INJECTION, SOLUTION INTRAVENOUS at 09:15

## 2023-12-31 RX ADMIN — CEFAZOLIN 2 G: 330 INJECTION, POWDER, FOR SOLUTION INTRAMUSCULAR; INTRAVENOUS at 10:04

## 2023-12-31 RX ADMIN — FAMOTIDINE 10 MG: 10 INJECTION, SOLUTION INTRAVENOUS at 09:18

## 2023-12-31 RX ADMIN — LIDOCAINE HYDROCHLORIDE,EPINEPHRINE BITARTRATE 5 ML: 20; .005 INJECTION, SOLUTION EPIDURAL; INFILTRATION; INTRACAUDAL; PERINEURAL at 10:40

## 2023-12-31 RX ADMIN — KETOROLAC TROMETHAMINE 30 MG: 30 INJECTION, SOLUTION INTRAMUSCULAR at 10:26

## 2023-12-31 RX ADMIN — SODIUM CHLORIDE, SODIUM LACTATE, POTASSIUM CHLORIDE, AND CALCIUM CHLORIDE: 600; 310; 30; 20 INJECTION, SOLUTION INTRAVENOUS at 09:21

## 2023-12-31 RX ADMIN — SODIUM CHLORIDE, POTASSIUM CHLORIDE, SODIUM LACTATE AND CALCIUM CHLORIDE 125 ML/HR: 600; 310; 30; 20 INJECTION, SOLUTION INTRAVENOUS at 20:06

## 2023-12-31 RX ADMIN — ACETAMINOPHEN 975 MG: 325 TABLET ORAL at 17:12

## 2023-12-31 RX ADMIN — Medication 120 MCG: at 11:32

## 2023-12-31 RX ADMIN — MORPHINE SULFATE 1 MG: 2 INJECTION, SOLUTION INTRAMUSCULAR; INTRAVENOUS at 10:31

## 2023-12-31 RX ADMIN — CEFAZOLIN 2 G: 330 INJECTION, POWDER, FOR SOLUTION INTRAMUSCULAR; INTRAVENOUS at 11:05

## 2023-12-31 RX ADMIN — Medication 120 MCG: at 11:10

## 2023-12-31 RX ADMIN — ALBUMIN (HUMAN) 250 ML: 12.5 SOLUTION INTRAVENOUS at 11:50

## 2023-12-31 RX ADMIN — FENTANYL CITRATE 100 MCG: 50 INJECTION, SOLUTION INTRAMUSCULAR; INTRAVENOUS at 09:39

## 2023-12-31 RX ADMIN — LIDOCAINE HYDROCHLORIDE,EPINEPHRINE BITARTRATE 10 ML: 20; .005 INJECTION, SOLUTION EPIDURAL; INFILTRATION; INTRACAUDAL; PERINEURAL at 09:39

## 2023-12-31 RX ADMIN — Medication 120 MCG: at 11:29

## 2023-12-31 RX ADMIN — LIDOCAINE HYDROCHLORIDE,EPINEPHRINE BITARTRATE 5 ML: 20; .005 INJECTION, SOLUTION EPIDURAL; INFILTRATION; INTRACAUDAL; PERINEURAL at 11:15

## 2023-12-31 RX ADMIN — LIDOCAINE HYDROCHLORIDE,EPINEPHRINE BITARTRATE 5 ML: 20; .005 INJECTION, SOLUTION EPIDURAL; INFILTRATION; INTRACAUDAL; PERINEURAL at 09:45

## 2023-12-31 RX ADMIN — AZITHROMYCIN MONOHYDRATE 500 MG: 500 INJECTION, POWDER, LYOPHILIZED, FOR SOLUTION INTRAVENOUS at 10:04

## 2023-12-31 RX ADMIN — Medication 160 MCG: at 11:13

## 2023-12-31 RX ADMIN — LIDOCAINE HYDROCHLORIDE,EPINEPHRINE BITARTRATE 5 ML: 20; .005 INJECTION, SOLUTION EPIDURAL; INFILTRATION; INTRACAUDAL; PERINEURAL at 09:49

## 2023-12-31 RX ADMIN — LIDOCAINE HYDROCHLORIDE,EPINEPHRINE BITARTRATE 5 ML: 20; .005 INJECTION, SOLUTION EPIDURAL; INFILTRATION; INTRACAUDAL; PERINEURAL at 11:41

## 2023-12-31 RX ADMIN — Medication 160 MCG: at 11:50

## 2023-12-31 RX ADMIN — SODIUM CHLORIDE 600 MILLI-UNITS/MIN: 9 INJECTION, SOLUTION INTRAVENOUS at 10:17

## 2023-12-31 RX ADMIN — KETOROLAC TROMETHAMINE 30 MG: 30 INJECTION, SOLUTION INTRAMUSCULAR; INTRAVENOUS at 17:12

## 2023-12-31 SDOH — HEALTH STABILITY: MENTAL HEALTH: NON-SPECIFIC ACTIVE SUICIDAL THOUGHTS (PAST 1 MONTH): NO

## 2023-12-31 SDOH — HEALTH STABILITY: MENTAL HEALTH: WERE YOU ABLE TO COMPLETE ALL THE BEHAVIORAL HEALTH SCREENINGS?: YES

## 2023-12-31 SDOH — HEALTH STABILITY: MENTAL HEALTH: SUICIDAL BEHAVIOR (LIFETIME): NO

## 2023-12-31 SDOH — SOCIAL STABILITY: SOCIAL INSECURITY: DOES ANYONE TRY TO KEEP YOU FROM HAVING/CONTACTING OTHER FRIENDS OR DOING THINGS OUTSIDE YOUR HOME?: NO

## 2023-12-31 SDOH — HEALTH STABILITY: MENTAL HEALTH: STRENGTHS (MUST CHOOSE TWO): SUPPORT FROM FAMILY;SUPPORT FROM FRIENDS

## 2023-12-31 SDOH — SOCIAL STABILITY: SOCIAL INSECURITY: VERBAL ABUSE: DENIES

## 2023-12-31 SDOH — HEALTH STABILITY: MENTAL HEALTH: WISH TO BE DEAD (PAST 1 MONTH): NO

## 2023-12-31 SDOH — SOCIAL STABILITY: SOCIAL INSECURITY: HAS ANYONE EVER THREATENED TO HURT YOUR FAMILY OR YOUR PETS?: NO

## 2023-12-31 SDOH — ECONOMIC STABILITY: HOUSING INSECURITY: DO YOU FEEL UNSAFE GOING BACK TO THE PLACE WHERE YOU ARE LIVING?: NO

## 2023-12-31 SDOH — SOCIAL STABILITY: SOCIAL INSECURITY: ARE THERE ANY APPARENT SIGNS OF INJURIES/BEHAVIORS THAT COULD BE RELATED TO ABUSE/NEGLECT?: NO

## 2023-12-31 SDOH — SOCIAL STABILITY: SOCIAL INSECURITY: PHYSICAL ABUSE: DENIES

## 2023-12-31 SDOH — HEALTH STABILITY: MENTAL HEALTH: HAVE YOU USED ANY PRESCRIPTION DRUGS OTHER THAN PRESCRIBED IN THE PAST 12 MONTHS?: NO

## 2023-12-31 SDOH — SOCIAL STABILITY: SOCIAL INSECURITY: ABUSE SCREEN: ADULT

## 2023-12-31 SDOH — SOCIAL STABILITY: SOCIAL INSECURITY: ARE YOU OR HAVE YOU BEEN THREATENED OR ABUSED PHYSICALLY, EMOTIONALLY, OR SEXUALLY BY ANYONE?: NO

## 2023-12-31 SDOH — SOCIAL STABILITY: SOCIAL INSECURITY: DO YOU FEEL ANYONE HAS EXPLOITED OR TAKEN ADVANTAGE OF YOU FINANCIALLY OR OF YOUR PERSONAL PROPERTY?: NO

## 2023-12-31 SDOH — SOCIAL STABILITY: SOCIAL INSECURITY: HAVE YOU HAD THOUGHTS OF HARMING ANYONE ELSE?: NO

## 2023-12-31 SDOH — HEALTH STABILITY: MENTAL HEALTH: HAVE YOU USED ANY SUBSTANCES (CANABIS, COCAINE, HEROIN, HALLUCINOGENS, INHALANTS, ETC.) IN THE PAST 12 MONTHS?: NO

## 2023-12-31 ASSESSMENT — ACTIVITIES OF DAILY LIVING (ADL): LACK_OF_TRANSPORTATION: NO

## 2023-12-31 ASSESSMENT — PATIENT HEALTH QUESTIONNAIRE - PHQ9
1. LITTLE INTEREST OR PLEASURE IN DOING THINGS: NOT AT ALL
SUM OF ALL RESPONSES TO PHQ9 QUESTIONS 1 & 2: 0
2. FEELING DOWN, DEPRESSED OR HOPELESS: NOT AT ALL

## 2023-12-31 ASSESSMENT — LIFESTYLE VARIABLES
AUDIT-C TOTAL SCORE: 0
HOW OFTEN DO YOU HAVE 6 OR MORE DRINKS ON ONE OCCASION: NEVER
AUDIT-C TOTAL SCORE: 0
HOW MANY STANDARD DRINKS CONTAINING ALCOHOL DO YOU HAVE ON A TYPICAL DAY: PATIENT DOES NOT DRINK
HOW OFTEN DO YOU HAVE A DRINK CONTAINING ALCOHOL: NEVER
SKIP TO QUESTIONS 9-10: 1

## 2023-12-31 ASSESSMENT — PAIN SCALES - GENERAL

## 2023-12-31 NOTE — H&P
Obstetrical Admission History and Physical     Criss Roman is a 28 y.o.  at 37.5wga by 6wk US presenting for contractions.    Chief Complaint: Contractions    Assessment/Plan      Labor  - SVE: 480/0, kervin q3-5min  - pt for pCS in s/o recent rectal prolapse surgery    Primary CS  -Admitted, consented, scanned - cephalic  -Delivery plan: Patient for primary  section in the setting of rectal prolapse surgery. The risks, benefits, complications, alternatives, expected outcomes, potential problems during recuperation and recovery, and the risks of not performing the procedure were discussed with the patient. All questions were answered.  -T&S and CBC on admission  -Epidural for pain control  -GBS neg    Fetal Well-Being  -PNLs reviewed and WNL, GBS as above  -CEFM, cat I currently    Postpartum   -PPBC: POPs    DVT PPx  -SCDs  -Ambulation if able    Seen and discussed w/ Dr. An and Dr. Ledy Ruth MD PGY-1  Obstetrics & Gynecology    Principal Problem:    Spontaneous onset of labor after 37 but before 39 completed weeks gestation with delivery by planned  section      Pregnancy Problems (from 23 to present)       Problem Noted Resolved    High risk multigravida in third trimester 2023 by Corinne A Bazella, MD No    Priority:  Medium      Candidal vulvitis 2023 by Jennifer Anaya MD No    Priority:  Medium      Overview Signed 2023  1:31 PM by Jennifer Anaya MD     Itching and redness in groin for 1 week.         36 weeks gestation of pregnancy 2023 by Yuki Chen MD No    Priority:  Medium      Overview Addendum 2023  1:17 PM by Jennifer Anaya MD     [x] datinwk US (not c/w LMP)  Baby boy Zabier  [x] Anatomic survey at 19-20w  [X] Third trimester labs (Syphilis, 1hr, CBC)  [ ] Immunizations: [ ] Flu [x] TDAP [ ] RSV [ ] Covid - declined flu/covid   [x] Contraception plan POPs  [x] feeding breast and bottle   [ ] 35w GBS -  collected 12/19  [x] del plan - for pCS in s/o rectal prolapse with surgery, scheduled 1/9         Anemia during pregnancy in third trimester 11/6/2023 by Yuki Chen MD No    Priority:  Medium      Overview Signed 11/7/2023  4:21 PM by Yuki Chen MD     - 11/6: hgb 10.6, ferritin 18  - PO iron         Rectal prolapse 11/6/2023 by Yuki Chen MD No    Priority:  Medium      Overview Signed 11/6/2023  2:41 PM by Yuki Chen MD     - pt reports history of rectal prolapse with subsequent surgery in [place]  - reports doctor recommended not to attempt a vaginal birth         Supervision of high risk pregnancy in first trimester 5/17/2023 by Yuki Chen MD 11/6/2023 by Yuki Chen MD    Overview Signed 11/6/2023  2:41 PM by Yuki Chen MD     Formatting of this note is different from the original. Dating: PATTIE:  Jan 8, 2024 based on sure LMP: Patient's last menstrual period was 04/03/2023 (exact date). Visits: 05/17/2023 6w2d: 06/15/2023 9w2d  Complains only of not getting any photos of her US.  Patient Dating changed.  Over one week difference in PATTIE.  Patient with nausea today. 07/20/2023 14w2d No complaints.  AFP next visit.  Anatomy scan ordered 08/17/2023 18w2d  No complaints.  AFP drawn today 09/21/2023 23w2d  No complaints today.  Has seen peds cardiology.  She was told nothing to do right now.  Declined amniocentesis 10/11/2023 26w1d  Glucola today.  No complaints today.   Labs: Due: Routine prenatal labs            Rh status: O Positive            Rubella status: IF non reactive (offer MMR PP)            Hemoglobin: Hemoglobin Date Value Ref Range Status 05/11/2023 13.4 12.0 - 15.0 g/dL Final            Last Pap: Last Pap Smear date (10 year lookback): 05/11/2023 [x] Syphilis: neg [x] Hepatitis  C: neg [x] Hepatitis B: neg [x] HIV: neg [] A1C: [] 1hGTT 24-28 weeks [] GBS 36 weeks Aspirin prophylaxis Patient meets either criteria below, start low dose ASA between  12w-16w [] 1 high risk indication (h/o preeclampsia, renal disease, autoimmune disease, pregestational DM, cHTN) [] 2+ moderate risk indications (nulliparity, AMA, BMI>30, 1st degree family member with preeclampsia, black race, low SES) Genetics/Ultrasound [x] Advanced Maternal Age: N/A [x] CF screen: Neg [x] Hgb electrophoresis: WNL [x] FTS: 11.5-13.6w: ordered: 06/15/2023 [] AFP 15-20w: [x] Anatomy u/s 20w: ordered 2023 [] 3rd trimester growth U/S: Immunizations [] Flu vaccine given: [] Tdap due at 28 weeks: Blood Products:  Acceptable Postpartum: [] Benefits of breast feeding discussed      Feeding plan: Breast feeding [] Postpartum contraception plan:               Seen and discussed w/ Dr. Ledy Ruth MD PGY-1  Obstetrics & Gynecology      Subjective   Criss is here complaining of q3-5 min contractions starting yesterday morning. Good fetal movement. Denies vaginal bleeding., Denies leaking of fluid.      Patient is for primary C/S in s/o rectal prolapse surgery. States she was told not to attempt vaginal delivery.     Obstetrical History   OB History    Para Term  AB Living   2 1 1 0 0 1   SAB IAB Ectopic Multiple Live Births   0 0 0 0 0      # Outcome Date GA Lbr Ahmet/2nd Weight Sex Delivery Anes PTL Lv   2 Current            1 Term      Vag-Spont         x1    Past Medical History  Past Medical History:   Diagnosis Date    Rectal prolapse 2023        Past Surgical History   Past Surgical History:   Procedure Laterality Date    APPENDECTOMY      RECTAL SURGERY      -rectal surgery       Social History  Social History     Tobacco Use    Smoking status: Never    Smokeless tobacco: Never   Substance Use Topics    Alcohol use: Not Currently     Substance and Sexual Activity   Drug Use Never       Allergies  Penicillin and Ampicillin-sulbactam     Medications  Medications Prior to Admission   Medication Sig Dispense Refill Last Dose    diphenhydrAMINE (Sominex) 25 mg tablet  Take 1 tablet (25 mg) by mouth as needed at bedtime for sleep. 30 tablet 0     ferrous sulfate (FerrouSuL) 325 (65 Fe) MG tablet Take 1 tablet (65 mg of iron) by mouth once daily with a meal. 30 tablet 11     nystatin-triamcinolone (Mycolog II) ointment Apply topically 2 times a day. 15 g 0     PNV no.163-iron-folate no.10 20 mg iron- 1 mg tablet Take 1 tablet by mouth once daily.       pyridoxine (Vitamin B-6) 25 mg tablet Take 1 tablet (25 mg) by mouth once daily.          Objective    Last Vitals  Temp Pulse Resp BP MAP O2 Sat   36.7 °C (98.1 °F) 102 18 102/63   99 %     Physical Examination  GENERAL: Examination reveals a well developed, well nourished, gravid female in no acute distress. She is alert and cooperative.  HEENT: External ears normal. Nose normal, no erythema or discharge. Mouth and throat clear.  NECK: supple, no significant adenopathy  LUNGS: Normal respiratory effort  HEART: RRR  ABDOMEN: Gravid  VAGINA: normal appearing vagina with normal color and discharge and no lesions noted  EXTREMITIES: no limitation in range of motion  SKIN: normal coloration and turgor, no rashes  NEUROLOGICAL: alert, oriented, normal speech, no focal findings or movement disorder noted  PSYCHOLOGICAL: awake and alert; oriented to person, place, and time    Cervical Exam  Dilation: 4  Effacement (%): 80  Fetal Station: 0  Method: Manual  OB Examiner: Faraz WHITE  Fetal Assessment  Movement: Present  Mode: External US  Baseline Fetal Heart Rate (bpm): 140 bpm  Baseline Classification: Normal  Variability: Moderate (Between 6 and 25 BPM)

## 2023-12-31 NOTE — DISCHARGE INSTRUCTIONS

## 2023-12-31 NOTE — ANESTHESIA PREPROCEDURE EVALUATION
Patient: Criss Roman    Evaluation Method: In-person visit    Procedure Information       Date: 23    Procedure: OBGYN Delivery  Section    Location: [facility]    Surgeons: Laura Villafana MD          28 y.o.  at 37w5d    Relevant Problems   Anesthesia (within normal limits)      Cardiovascular (within normal limits)      Endocrine (within normal limits)      /Renal (within normal limits)      GI/Hepatic (within normal limits)      Hematology   (+) Anemia during pregnancy in third trimester      Infectious Disease   (+) Candidal vulvitis     Past Surgical History:   Procedure Laterality Date    APPENDECTOMY      RECTAL SURGERY      -rectal surgery     No anesthesia complications.  Pt is NPO after 1AM.    Clinical information reviewed:    Allergies  Meds               NPO Detail:  No data recorded     OB/GYN     Physical Exam    Airway  Mallampati: II  TM distance: >3 FB  Neck ROM: full     Cardiovascular - normal exam  Rhythm: regular  Rate: normal     Dental - normal exam     Pulmonary - normal exam  Breath sounds clear to auscultation     Abdominal          Anesthesia Plan    ASA 2     CSE     Anesthetic plan and risks discussed with patient.  Use of blood products discussed with patient who consented to blood products.    Plan discussed with attending and resident.

## 2023-12-31 NOTE — L&D DELIVERY NOTE
OB Delivery Note  2023  Criss Roman  28 y.o.     Gestational Age: 37w5d  /Para:   Quantitative Blood Loss: Admission to Discharge: 2000 mL (2023  8:25 AM - 2023  6:39 PM)    Tristan Roman [[id number]]      Labor Events    Sac identifier: Sac 1  Rupture date/time: 2023 1016  Rupture type: Artificial  Fluid color: Clear  Fluid odor: None  Labor type: Spontaneous Onset of Labor  Labor allowed to proceed with plans for an attempted vaginal birth?: No  Augmentation: None  Complications: Hemorrhage       Labor Length    3rd stage: 0h 03m       Placenta    Placenta delivery date/time: 2023 1019  Placenta removal: Manual removal  Placenta disposition: discarded       Cord    Vessels: 3 vessels  Complications: None  Delayed cord clamping?: Yes  Cord clamped date/time: 2023 1016  Cord blood disposition: Lab  Gases sent?: No  Stem cell collection (by provider): No       Anesthesia    Method: Epidural       Operative Delivery    Forceps attempted?: No  Vacuum extractor attempted?: No       Shoulder Dystocia    Shoulder dystocia present?: No        Delivery    Birth date/time: 2021 10:16:00  Delivery type: , Low Transverse   categorization: primary   priority: routine  Indications for : Other (Add Comments)  Incision type: low transverse  Complications: Hemorrhage       Resuscitation    Method: Suctioning, Tactile stimulation       Apgars    Living status: Living  Apgar Component Scores:  1 min.:  5 min.:  10 min.:  15 min.:  20 min.:    Skin color:  1  1       Heart rate:  2  2       Reflex irritability:  2  2       Muscle tone:  2  2       Respiratory effort:  2  2       Total:  9  9       Apgars assigned by: YAQUELIN AMBROSIO       Delivery Providers    Delivering clinician: Laura Villafana MD   Provider Role     Delivery Nurse     Nursery Nurse    Arti An MD Resident    Simran Ruth MD Resident                 Delivery Operative Report     Indications: Patient presented at 37.5w with spontaneous onset of labor. She had a history of 1 prior vaginal delivery, however following that delivery she had surgical repair of rectal prolapse. Her surgeon had advised against a vaginal delivery, and therefore it was recommended she proceed with  delivery.     Procedure Details   Pre op diagnosis:    1. IUP at 37.5 weeks   2. H/o rectal prolapse surgery     Post op diagnosis: Same    Findings: Normal appearing gravid uterus, fallopian tubes, and ovaries. Amniotic fluid clear, infant in cephalic presentation.     Procedure: Pt was taken to the OR where combined spinal epidural anesthesia was administered.  She was then placed in the dorsal supine position with a left lateral tilt. A harrington catheter was placed, SCD’s were applied, and a vaginal prep was performed. A pre-procedure time out was performed.  The pt was given prophylactic dose of IV antibiotics- Ancef and Azithromycin.  She was then prepped and draped in the usual sterile fashion. A Pfannenstiel skin incision was made with the scalpel through the skin and subcutaneous fat to the underlying fascial layer.  The fascia was incised in the midline with the scalpel and the incision was extended bilaterally. The superior aspect of the incision was grasped, tented up with Kocher clamps and the rectus muscle was dissected off. The muscles were divided in the midline, the peritoneum was then identified and entered bluntly and incision extended superiorly and inferiorly taking care to avoid underlying viscera. The bladder blade was placed in the pelvis.       A low transverse uterine incision was made with the scalpel, the uterine cavity was entered, and the hysterotomy was extended cephalocaudally by stretching.  The infant was delivered atraumatically, the cord was clamped and cut and infant was handed off to awaiting nursing.  A blood sample was collected.  The placenta was  then expressed.  The uterus was exteriorized and cleared of all clot and debris. The uterine incision was noted to be bleeding heavily in several areas, over which T-clamps were placed for hemostasis. The uterine incision was repaired using a running locked stitch of 2-0 PDS. A second layer of the same suture was placed in an imbricating fashion. Good hemostasis was noted.     At this point, the Harrington was noted to be draining bright red urine with several small blood clots. The hysterotomy and bladder were again closely evaluated. The hysterotomy was noted to be a safe distance from the bladder, and there were no bladder defects noted. The uterus was placed back inside the pelvis. The decision was made to back-fill the bladder with 100cc of normal saline. The bladder was again closely examined and no defects were noted. The harrington was drained.     The gutters were cleared of all clots and debris. Several large clots were evacuated from the gutters and the EBL was noted to be >1500cc, so antibiotics were redosed.     Several small serosal bleeders along the hysterotomy and rectus were cauterized. The hysterotomy was again evaluated and found to be hemostatic, the underside of the fascia and bladder and the rectus muscles were also found to be hemostatic.      The fascia was closed using a running stitch of looped 0-PDS.  During fascial closure, bright red blood was noted to be accumulating. The hysterotomy was again inspected and found to be hemostatic. The decision was made to remove the fascial stitch of 0-PDS and explore for a source of bleeding. The uterus was exteriorized. Several large clots were evacuated from the paracolic gutters. The omentum was examined and found to be hemostatic. The posterior uterus, fallopian tubes, and ovaries were hemostatic, as well as the rectus, bladder, and undersides of the fascia. The hysterotomy was evaluated again and several bleeders were noted. Multiple [id number] using  2-0 Vicryl were placed along the hysterotomy. Excellent hemostasis was then achieved.     After examining again for hemostasis, the fascia was then closed using a running stitch of looped 0-PDS. The subcutaneous layer was irrigated and small bleeders were cauterized. The skin was closed with 4-0 Vicryl.  All counts were correct, the patient tolerated the procedure well.  Dr. Villafana was present for the entire procedure.  The patient and infant were taken back to the recovery room in stable condition.    Condition:  stable    Plan:  Returned to labor and delivery room in stable condition. Received 1u pRBCs after arriving to labor and delivery room given high EBL and low-normotensive Bps. CBC, coagulation screen, and fibrinogen were immediately drawn.     Dr. Villafana was present for the entire procedure.   Arti An MD

## 2023-12-31 NOTE — ANESTHESIA PROCEDURE NOTES
CSE Block    Start time: 12/31/2023 9:20 AM  End time: 12/31/2023 9:38 AM  Reason for block: primary anesthetic, procedure for pain and post-op pain management}  Staffing  Performed: resident   Authorized by: Deacon Briones DO    Performed by: Deacon Briones DO    Preanesthetic Checklist  Completed: patient identified, IV checked, risks and benefits discussed, surgical consent, monitors and equipment checked, pre-op evaluation, timeout performed and sterile techniques followed  Block Timeout  RN/Licensed healthcare professional reads aloud to the Anesthesia provider and entire team: Patient identity, procedure with side and site, patient position, and as applicable the availability of implants/special equipment/special requirements.  Patient on coagulant treatment: no  Timeout performed at: 12/31/2023 9:25 AM    CSE Block  Patient position: right lateral decubitus  Prep: ChloraPrep  Sterility prep: cap, drape, gloves and mask  Sedation level: no sedation  Patient monitoring: continuous pulse oximetry and heart rate  Approach: midline  Local numbing: lidocaine 1% to skin and subcutaneous tissues  Vertebral space: lumbar  L3-4  Guidance: landmark technique    Epidural Needle  RAMYA technique: saline  Needle type: Tuohy   Needle gauge: 17 G  Needle length: 10 cm  Needle insertion depth: 4 cm  Epidural Catheter  Catheter type: end hole  Catheter size: 18 G  Catheter at skin depth: 9 cm  Catheter securement method: clear occlusive dressing and liquid medical adhesive    Test dose given at 12/31/2023 9:37 AM  Test dose: lidocaine 1.5% with epinephrine 1-to-200,000 (5ML)  Test dose result: no positive test dose            Assessment bilateral  Block outcome: block to be assessed in the OR  Number of attempts: 1  Events: no positive test dose  Procedure assessment: patient tolerated procedure well with no immediate complications  Additional Notes  Spinal portion aborted, epidural catheter placed after loss of resistance

## 2024-01-01 PROBLEM — B37.31 CANDIDAL VULVITIS: Status: RESOLVED | Noted: 2023-12-19 | Resolved: 2024-01-01

## 2024-01-01 PROBLEM — O35.BXX0 ABNORMAL FETAL ECHOCARDIOGRAM AFFECTING ANTEPARTUM CARE OF MOTHER (HHS-HCC): Status: RESOLVED | Noted: 2023-09-06 | Resolved: 2024-01-01

## 2024-01-01 PROBLEM — Z3A.36 36 WEEKS GESTATION OF PREGNANCY (HHS-HCC): Status: RESOLVED | Noted: 2023-11-06 | Resolved: 2024-01-01

## 2024-01-01 PROBLEM — O09.43 HIGH RISK MULTIGRAVIDA IN THIRD TRIMESTER (HHS-HCC): Status: RESOLVED | Noted: 2023-12-19 | Resolved: 2024-01-01

## 2024-01-01 LAB
ERYTHROCYTE [DISTWIDTH] IN BLOOD BY AUTOMATED COUNT: 14.6 % (ref 11.5–14.5)
HCT VFR BLD AUTO: 26 % (ref 36–46)
HGB BLD-MCNC: 9.2 G/DL (ref 12–16)
MCH RBC QN AUTO: 31.1 PG (ref 26–34)
MCHC RBC AUTO-ENTMCNC: 35.4 G/DL (ref 32–36)
MCV RBC AUTO: 88 FL (ref 80–100)
NRBC BLD-RTO: 0 /100 WBCS (ref 0–0)
PLATELET # BLD AUTO: 214 X10*3/UL (ref 150–450)
RBC # BLD AUTO: 2.96 X10*6/UL (ref 4–5.2)
WBC # BLD AUTO: 12.4 X10*3/UL (ref 4.4–11.3)

## 2024-01-01 PROCEDURE — 2500000005 HC RX 250 GENERAL PHARMACY W/O HCPCS

## 2024-01-01 PROCEDURE — 2500000001 HC RX 250 WO HCPCS SELF ADMINISTERED DRUGS (ALT 637 FOR MEDICARE OP)

## 2024-01-01 PROCEDURE — 85027 COMPLETE CBC AUTOMATED: CPT

## 2024-01-01 PROCEDURE — 1100000001 HC PRIVATE ROOM DAILY

## 2024-01-01 PROCEDURE — 36415 COLL VENOUS BLD VENIPUNCTURE: CPT

## 2024-01-01 PROCEDURE — 2500000004 HC RX 250 GENERAL PHARMACY W/ HCPCS (ALT 636 FOR OP/ED)

## 2024-01-01 RX ORDER — ONDANSETRON 4 MG/1
4 TABLET, FILM COATED ORAL EVERY 8 HOURS PRN
Status: DISCONTINUED | OUTPATIENT
Start: 2024-01-01 | End: 2024-01-03 | Stop reason: HOSPADM

## 2024-01-01 RX ORDER — ONDANSETRON HYDROCHLORIDE 2 MG/ML
4 INJECTION, SOLUTION INTRAVENOUS EVERY 8 HOURS PRN
Status: DISCONTINUED | OUTPATIENT
Start: 2024-01-01 | End: 2024-01-03 | Stop reason: HOSPADM

## 2024-01-01 RX ADMIN — LIDOCAINE 1 PATCH: 4 PATCH TOPICAL at 09:17

## 2024-01-01 RX ADMIN — KETOROLAC TROMETHAMINE 30 MG: 30 INJECTION, SOLUTION INTRAMUSCULAR; INTRAVENOUS at 00:00

## 2024-01-01 RX ADMIN — OXYCODONE HYDROCHLORIDE 10 MG: 5 TABLET ORAL at 12:13

## 2024-01-01 RX ADMIN — IBUPROFEN 600 MG: 600 TABLET, FILM COATED ORAL at 12:13

## 2024-01-01 RX ADMIN — KETOROLAC TROMETHAMINE 30 MG: 30 INJECTION, SOLUTION INTRAMUSCULAR; INTRAVENOUS at 05:49

## 2024-01-01 RX ADMIN — OXYCODONE HYDROCHLORIDE 10 MG: 5 TABLET ORAL at 21:40

## 2024-01-01 RX ADMIN — ACETAMINOPHEN 975 MG: 325 TABLET ORAL at 18:14

## 2024-01-01 RX ADMIN — IBUPROFEN 600 MG: 600 TABLET, FILM COATED ORAL at 18:14

## 2024-01-01 RX ADMIN — ACETAMINOPHEN 975 MG: 325 TABLET ORAL at 00:01

## 2024-01-01 RX ADMIN — OXYCODONE HYDROCHLORIDE 10 MG: 5 TABLET ORAL at 16:19

## 2024-01-01 RX ADMIN — ACETAMINOPHEN 975 MG: 325 TABLET ORAL at 12:13

## 2024-01-01 RX ADMIN — ACETAMINOPHEN 975 MG: 325 TABLET ORAL at 05:49

## 2024-01-01 ASSESSMENT — PAIN SCALES - GENERAL
PAINLEVEL_OUTOF10: 9
PAINLEVEL_OUTOF10: 9
PAINLEVEL_OUTOF10: 6
PAINLEVEL_OUTOF10: 8
PAINLEVEL_OUTOF10: 0 - NO PAIN
PAINLEVEL_OUTOF10: 0 - NO PAIN

## 2024-01-01 ASSESSMENT — PAIN - FUNCTIONAL ASSESSMENT
PAIN_FUNCTIONAL_ASSESSMENT: 0-10
PAIN_FUNCTIONAL_ASSESSMENT: 0-10

## 2024-01-01 ASSESSMENT — PAIN DESCRIPTION - LOCATION
LOCATION: INCISION
LOCATION: ABDOMEN

## 2024-01-01 NOTE — ANESTHESIA POSTPROCEDURE EVALUATION
Patient: Criss Roman    Procedure Summary       Date: 23 Room / Location: [facility]    Anesthesia Start: 920 Anesthesia Stop:     Procedure: OBGYN Delivery  Section Diagnosis:       Rectal prolapse      Spontaneous onset of labor after 37 but before 39 completed weeks gestation with delivery by planned  section, delivered with mention of postpartum complication      (Rectal prolapse [K62.3])      (Spontaneous onset of labor after 37 but before 39 completed weeks gestation with delivery by planned  section, delivered with mention of postpartum complication [O75.82])    Surgeons: Laura Villafana MD Responsible Provider: Rodri Ferreira MD    Anesthesia Type: CSE ASA Status: 2            Anesthesia Type: CSE    Vitals Value Taken Time   /60 23 1412   Temp 36.5 °C (97.7 °F) 23 1327   Pulse 81 23 1412   Resp 18 23 1412   SpO2 100 % 23 1412       Anesthesia Post Evaluation    Patient location during evaluation: bedside  Patient participation: complete - patient participated  Level of consciousness: awake and alert  Pain management: adequate  Airway patency: patent  Cardiovascular status: acceptable  Respiratory status: acceptable  Hydration status: acceptable  Postoperative Nausea and Vomiting: none        No notable events documented.

## 2024-01-01 NOTE — PROGRESS NOTES
Postpartum Progress Note    Assessment/Plan   Criss Roman is a 28 y.o., , who delivered at 37w5d gestation and is now postpartum day 0 from a pCS    S/P pCS on , EBL 2L  - Doing well, pain well controlled with PO meds, afebrile, tolerating diet, voiding  - Starting hgb 12.3 -> 2L EBL -> 10.2 -> 1u RBC. Will follow up 4 hour post transfusion CBC. - No signs/symptoms of anemia    Routine Postpartum  - Feeding: Would like to pump, but has not started. Continue to encourage. Lactation c/s PRN  - PPBC: POPs  - Anti-emetics PRN and bowel regimen ordered  - Continue routine postpartum care.   - DVT ppx: DVT risk score 4. Ambulation, SCDs    Dispo  - Anticipate discharge on POD#3 if continues to meet milestones  - Plan for incision check in 2 wks, postpartum visit in 4-6wks    To be Discussed with Dr. Ananya Abraham MD, PGY-1    Subjective   Her pain is well controlled with current medications  She is passing flatus  She is ambulating well  She is tolerating a Adult diet Regular    No dizziness, lightheadedness, or SOB.    Objective   Allergies:   Penicillin and Ampicillin-sulbactam         Last Vitals:  Temp Pulse Resp BP MAP Pulse Ox   37.2 °C (99 °F) 79 16 101/60   100 %     Vitals Min/Max Last 24 Hours:  Temp  Min: 36.5 °C (97.7 °F)  Max: 37.2 °C (99 °F)  Pulse  Min: 71  Max: 112  Resp  Min: 16  Max: 18  BP  Min: 87/52  Max: 111/66    Intake/Output:     Intake/Output Summary (Last 24 hours) at 2023  Last data filed at 2023 1813  Gross per 24 hour   Intake 350 ml   Output 2730 ml   Net -2380 ml       Physical Exam:  General: Examination reveals a well developed, well nourished, female, in no acute distress. She is alert and cooperative.  HEENT: PERRLA. External ears normal.  Lungs: No increased work of breathing on room air.  Cardiac: Regular rate.  Incision: Covered with bandage, no shadowing, surrounding area dry.  Fundus: firm and below umbilicus.  Extremities: Full  ROM.  Neurological: alert, oriented, normal speech, no focal findings or movement disorder noted.    Lab Data:  Lab Results   Component Value Date    WBC 13.3 (H) 12/31/2023    HGB 10.2 (L) 12/31/2023    HCT 30.6 (L) 12/31/2023     12/31/2023

## 2024-01-01 NOTE — PROGRESS NOTES
Postpartum Progress Note    Assessment/Plan     Criss Roman is a 28 y.o., , who initially presented for primary C/S in s/o rectal prolapse surgery  She had a , Low Transverse  delivery on 2023  at 37w5d and is now POD1.    #Post-op , Low Transverse   - continue routine postoperative care  - pain well controlled on ERAS protocol  - DVT Score: 4 SCDs  - The patient's blood type is O POS. The baby's blood type is A POS . Rhogam is not indicated.    #acute blood loss anemia,   - Hg  12.3  --> EBL 2L -> 1u PRBC -> 10.2 -> 10 -> POD1 9.2;   - asymptomatic, PO iron supplement on DC   - BP's low normotensive at baseline; voiding  - repeat POD2 CBC    #Contraception  We discussed the need to take the pill at exactly the same time everyday and if >2hrs late a backup method must be used for 1 week. Pt verbalized understanding.     #Maternal Well-Being  - emotional support provided  - [place] feeding: Patient is strictly formula feeding. We reviewed non-pharmacologic methods of lactation suppression and s/sx of mastitis.     #Dispo  - anticipate d/c on POD #3 if meeting all post-op and postpartum milestones  - for f/u 2wks with Primary OB provider     Principal Problem:     delivery delivered  Active Problems:    Rectal prolapse    Spontaneous onset of labor after 37 but before 39 completed weeks gestation with delivery by planned  section    Spontaneous onset of labor after 37 but before 39 completed weeks gestation with delivery by planned  section, delivered with mention of postpartum complication      Subjective   Her pain is moderately controlled with current medications  She is not passing flatus  She is ambulating independently  She is tolerating a Adult diet Regular  She is voiding spontaneously  She reports no breast or nursing problems  She denies emotional concerns today     PT seen after being given 10mg oxycodone for pain. Dressing removed.   denies  dizziness/lightheadedness/LOC/uncontrolled bleeding.     Objective   Last Vitals:  Temp Pulse Resp BP MAP Pulse Ox   36.4 °C (97.5 °F) 74 16 (!) 92/49 (15 minute recheck. RN made aware.)   94 %     Vitals Min/Max Last 24 Hours:  Temp  Min: 36.2 °C (97.2 °F)  Max: 37.2 °C (99 °F)  Pulse  Min: 69  Max: 105  Resp  Min: 16  Max: 18  BP  Min: 89/45  Max: 111/66    Intake/Output:     Intake/Output Summary (Last 24 hours) at 1/1/2024 1253  Last data filed at 1/1/2024 1213  Gross per 24 hour   Intake 1954.81 ml   Output 1505 ml   Net 449.81 ml       Physical Exam:  General: well appearing, thin, postpartum  Obstetric: fundus firm below umbilicus, lochia light  Skin: Warm, dry; no rashes/lesions/erythema; Pfannenstiel incision: clean, dry, well approximated, open to air, negative discharge/warmth/erythema   Breast: No masses, nipple discharge  Neuro: A/Ox3, no gross motor deficit   GI: no distension, appropriately tender, soft, +BS  Respiratory: Even and unlabored on RA, LSCTA BL  Cardiovascular: Trace BLE edema; No erythema, warmth  Psych: appropriate mood and affect    Lab Data:  Lab Results   Component Value Date    WBC 12.4 (H) 01/01/2024    HGB 9.2 (L) 01/01/2024    HCT 26.0 (L) 01/01/2024     01/01/2024

## 2024-01-01 NOTE — ANESTHESIA POSTPROCEDURE EVALUATION
Patient: Criss Roman    Procedure Summary       Date: 23 Room / Location: [facility]    Anesthesia Start: 920 Anesthesia Stop:     Procedure: OBGYN Delivery  Section Diagnosis:       Rectal prolapse      Spontaneous onset of labor after 37 but before 39 completed weeks gestation with delivery by planned  section, delivered with mention of postpartum complication      (Rectal prolapse [K62.3])      (Spontaneous onset of labor after 37 but before 39 completed weeks gestation with delivery by planned  section, delivered with mention of postpartum complication [O75.82])    Surgeons: Laura Villafana MD Responsible Provider: Rodri Ferreira MD    Anesthesia Type: CSE ASA Status: 2            Anesthesia Type: CSE    Vitals Value Taken Time   /60 23 1412   Temp 36.5 °C (97.7 °F) 23 1327   Pulse 81 23 1412   Resp 18 23 1412   SpO2 100 % 23 1412       Anesthesia Post Evaluation    Patient location during evaluation: floor  Patient participation: complete - patient participated  Level of consciousness: awake  Pain management: adequate  Airway patency: patent  Cardiovascular status: acceptable  Respiratory status: acceptable  Hydration status: acceptable  Postoperative Nausea and Vomiting: none  Comments: Neuraxial site assessed. No visible redness or swelling or drainage. Patient able to ambulate and move all extremities without difficulty. Able to void. No complaints of nausea/vomiting. Tolerating PO intake well. No s/sx of PDPH.      No notable events documented.

## 2024-01-02 LAB
ERYTHROCYTE [DISTWIDTH] IN BLOOD BY AUTOMATED COUNT: 15 % (ref 11.5–14.5)
HCT VFR BLD AUTO: 28.6 % (ref 36–46)
HGB BLD-MCNC: 9.8 G/DL (ref 12–16)
MCH RBC QN AUTO: 31.4 PG (ref 26–34)
MCHC RBC AUTO-ENTMCNC: 34.3 G/DL (ref 32–36)
MCV RBC AUTO: 92 FL (ref 80–100)
NRBC BLD-RTO: 0 /100 WBCS (ref 0–0)
PLATELET # BLD AUTO: 225 X10*3/UL (ref 150–450)
RBC # BLD AUTO: 3.12 X10*6/UL (ref 4–5.2)
WBC # BLD AUTO: 15 X10*3/UL (ref 4.4–11.3)

## 2024-01-02 PROCEDURE — 2500000005 HC RX 250 GENERAL PHARMACY W/O HCPCS

## 2024-01-02 PROCEDURE — 2500000001 HC RX 250 WO HCPCS SELF ADMINISTERED DRUGS (ALT 637 FOR MEDICARE OP)

## 2024-01-02 PROCEDURE — 1100000001 HC PRIVATE ROOM DAILY

## 2024-01-02 PROCEDURE — 85027 COMPLETE CBC AUTOMATED: CPT | Performed by: NURSE PRACTITIONER

## 2024-01-02 PROCEDURE — 36415 COLL VENOUS BLD VENIPUNCTURE: CPT | Performed by: NURSE PRACTITIONER

## 2024-01-02 PROCEDURE — 2500000004 HC RX 250 GENERAL PHARMACY W/ HCPCS (ALT 636 FOR OP/ED)

## 2024-01-02 RX ADMIN — LIDOCAINE 1 PATCH: 4 PATCH TOPICAL at 09:25

## 2024-01-02 RX ADMIN — ONDANSETRON HYDROCHLORIDE 4 MG: 4 TABLET, FILM COATED ORAL at 01:06

## 2024-01-02 RX ADMIN — IBUPROFEN 600 MG: 600 TABLET, FILM COATED ORAL at 06:41

## 2024-01-02 RX ADMIN — POLYETHYLENE GLYCOL 3350 17 G: 17 POWDER, FOR SOLUTION ORAL at 09:37

## 2024-01-02 RX ADMIN — ACETAMINOPHEN 975 MG: 325 TABLET ORAL at 00:32

## 2024-01-02 RX ADMIN — IBUPROFEN 600 MG: 600 TABLET, FILM COATED ORAL at 00:32

## 2024-01-02 RX ADMIN — ACETAMINOPHEN 975 MG: 325 TABLET ORAL at 11:07

## 2024-01-02 RX ADMIN — IBUPROFEN 600 MG: 600 TABLET, FILM COATED ORAL at 22:15

## 2024-01-02 RX ADMIN — ACETAMINOPHEN 975 MG: 325 TABLET ORAL at 06:41

## 2024-01-02 RX ADMIN — SIMETHICONE 80 MG: 80 TABLET, CHEWABLE ORAL at 09:37

## 2024-01-02 RX ADMIN — IBUPROFEN 600 MG: 600 TABLET, FILM COATED ORAL at 11:07

## 2024-01-02 RX ADMIN — ACETAMINOPHEN 975 MG: 325 TABLET ORAL at 22:15

## 2024-01-02 ASSESSMENT — PAIN SCALES - GENERAL
PAINLEVEL_OUTOF10: 10 - WORST POSSIBLE PAIN
PAINLEVEL_OUTOF10: 4
PAINLEVEL_OUTOF10: 6
PAINLEVEL_OUTOF10: 0 - NO PAIN
PAINLEVEL_OUTOF10: 4
PAINLEVEL_OUTOF10: 7
PAINLEVEL_OUTOF10: 10 - WORST POSSIBLE PAIN
PAINLEVEL_OUTOF10: 0 - NO PAIN

## 2024-01-02 ASSESSMENT — PAIN DESCRIPTION - DESCRIPTORS
DESCRIPTORS: SORE
DESCRIPTORS: SORE

## 2024-01-02 ASSESSMENT — PAIN DESCRIPTION - LOCATION: LOCATION: ABDOMEN

## 2024-01-02 NOTE — CARE PLAN
Problem: Postpartum  Goal: Experiences normal postpartum course  Outcome: Progressing  Goal: Appropriate maternal -  bonding  Outcome: Progressing  Goal: Establish and maintain infant feeding pattern for adequate nutrition  Outcome: Progressing  Goal: Incisions, wounds, or drain sites healing without S/S of infection  Outcome: Progressing

## 2024-01-02 NOTE — PROGRESS NOTES
Postpartum Progress Note    Assessment/Plan   Criss Roman is a 28 y.o., , who delivered at 37w5d gestation and is now postpartum day 2.    #Post-op , Low Transverse   - continue routine postoperative care  - pain well controlled on ERAS protocol  - DVT Score: 4 SCDs  - The patient's blood type is O POS. The baby's blood type is A POS . Rhogam is not indicated.     #acute blood loss anemia,   - Hg  12.3  --> EBL 2L -> 1u PRBC -> 10.2 -> 10 -> POD1 9.2;   - asymptomatic, PO iron supplement on DC   - BP's low normotensive at baseline; voiding  - repeat POD2 CBC     #Contraception  We discussed the need to take the pill at exactly the same time everyday and if >2hrs late a backup method must be used for 1 week. Pt verbalized understanding.      #Maternal Well-Being  - emotional support provided  - [place] feeding: Patient is strictly formula feeding. We reviewed non-pharmacologic methods of lactation suppression and s/sx of mastitis.      #Dispo  - anticipate d/c on POD #3 if meeting all post-op and postpartum milestones  - for f/u 2wks with Primary OB provider     Principal Problem:     delivery delivered  Active Problems:    Rectal prolapse    Spontaneous onset of labor after 37 but before 39 completed weeks gestation with delivery by planned  section    Spontaneous onset of labor after 37 but before 39 completed weeks gestation with delivery by planned  section, delivered with mention of postpartum complication    Pregnancy Problems (from 23 to present)       Problem Noted Resolved    Anemia during pregnancy in third trimester 2023 by Yuki Chen MD No    Priority:  Medium      Overview Signed 2023  4:21 PM by Yuki Chen MD     - : hgb 10.6, ferritin 18  - PO iron         Rectal prolapse 2023 by Yuki Chen MD No    Priority:  Medium      Overview Signed 2023  2:41 PM by Yuki Chen MD     - pt reports history of rectal  prolapse with subsequent surgery in [place]  - reports doctor recommended not to attempt a vaginal birth         High risk multigravida in third trimester 2023 by Corinne A Bazella, MD 2024 by MILADIS Cintron-CNP    Candidal vulvitis 2023 by Jennifer Anaya MD 2024 by MILADIS Cintron-CNP    Overview Signed 2023  1:31 PM by Jennifer Anaya MD     Itching and redness in groin for 1 week.         36 weeks gestation of pregnancy 2023 by Yuki Chen MD 2024 by MILADIS Cintron-CNP    Overview Addendum 2023  1:17 PM by Jennifer Anaya MD     [x] datinwk US (not c/w LMP)  Baby boy Oluer  [x] Anatomic survey at 19-20w  [X] Third trimester labs (Syphilis, 1hr, CBC)  [ ] Immunizations: [ ] Flu [x] TDAP [ ] RSV [ ] Covid - declined flu/covid   [x] Contraception plan POPs  [x] feeding breast and bottle   [ ] 35w GBS - collected   [x] del plan - for pCS in s/o rectal prolapse with surgery, scheduled          Supervision of high risk pregnancy in first trimester 2023 by Yuki Chen MD 2023 by Yuki Chen MD    Overview Signed 2023  2:41 PM by Yuki Chen MD     Formatting of this note is different from the original. Dating: PATTIE:  2024 based on sure LMP: Patient's last menstrual period was 2023 (exact date). Visits: 2023 6w2d: 06/15/2023 9w2d  Complains only of not getting any photos of her US.  Patient Dating changed.  Over one week difference in PATTIE.  Patient with nausea today. 2023 14w2d No complaints.  AFP next visit.  Anatomy scan ordered 2023 18w2d  No complaints.  AFP drawn today 2023 23w2d  No complaints today.  Has seen peds cardiology.  She was told nothing to do right now.  Declined amniocentesis 10/11/2023 26w1d  Glucola today.  No complaints today.   Labs: Due: Routine prenatal labs            Rh status: O Positive            Rubella status: IF non reactive (offer MMR PP)             Hemoglobin: Hemoglobin Date Value Ref Range Status 2023 13.4 12.0 - 15.0 g/dL Final            Last Pap: Last Pap Smear date (10 year lookback): 2023 [x] Syphilis: neg [x] Hepatitis  C: neg [x] Hepatitis B: neg [x] HIV: neg [] A1C: [] 1hGTT 24-28 weeks [] GBS 36 weeks Aspirin prophylaxis Patient meets either criteria below, start low dose ASA between 12w-16w [] 1 high risk indication (h/o preeclampsia, renal disease, autoimmune disease, pregestational DM, cHTN) [] 2+ moderate risk indications (nulliparity, AMA, BMI>30, 1st degree family member with preeclampsia, black race, low SES) Genetics/Ultrasound [x] Advanced Maternal Age: N/A [x] CF screen: Neg [x] Hgb electrophoresis: WNL [x] FTS: 11.5-13.6w: ordered: 06/15/2023 [] AFP 15-20w: [x] Anatomy u/s 20w: ordered 2023 [] 3rd trimester growth U/S: Immunizations [] Flu vaccine given: [] Tdap due at 28 weeks: Blood Products:  Acceptable Postpartum: [] Benefits of breast feeding discussed      Feeding plan: Breast feeding [] Postpartum contraception plan:               Hospital course: no complications   section delivery  Patient is not breastfeedingThe patient's blood type is O POS. The baby's blood type is A POS . Rhogam is not indicated.    Subjective   Her pain is well controlled with current medications  She is passing flatus  She is ambulating well  She is tolerating a Adult diet Regular  She reports no breast or nursing problems  She denies emotional concerns today   Her plan for contraception is none     Meeting all post op milestones    Objective   Allergies:   Penicillin and Ampicillin-sulbactam         Last Vitals:  Temp Pulse Resp BP MAP Pulse Ox   36.1 °C (97 °F) 63 16 92/54   96 %     Vitals Min/Max Last 24 Hours:  Temp  Min: 36.1 °C (97 °F)  Max: 36.9 °C (98.4 °F)  Pulse  Min: 63  Max: 78  Resp  Min: 16  Max: 16  BP  Min: 87/50  Max: 100/62    Intake/Output:     Intake/Output Summary (Last 24 hours) at 2024 1108  Last data  filed at 1/1/2024 6727  Gross per 24 hour   Intake --   Output 600 ml   Net -600 ml       Physical Exam:  General: Examination reveals a well developed, well nourished, female, in no acute distress. She is alert and cooperative.  Lungs: symmetrical, non-labored breathing.  Cardiac: warm, well-perfused.  Abdomen: soft, non-tender.  Fundus: firm, at umbilicus, and nontender.  Extremities: no redness or tenderness in the calves or thighs.  Neurological: alert, oriented, normal speech, no focal findings or movement disorder noted.     Lab Data:  Lab Results   Component Value Date    WBC 15.0 (H) 01/02/2024    HGB 9.8 (L) 01/02/2024    HCT 28.6 (L) 01/02/2024     01/02/2024

## 2024-01-03 VITALS
BODY MASS INDEX: 23.62 KG/M2 | DIASTOLIC BLOOD PRESSURE: 59 MMHG | HEIGHT: 65 IN | OXYGEN SATURATION: 95 % | SYSTOLIC BLOOD PRESSURE: 97 MMHG | WEIGHT: 141.76 LBS | HEART RATE: 76 BPM | RESPIRATION RATE: 16 BRPM | TEMPERATURE: 97.9 F

## 2024-01-03 LAB
BLOOD EXPIRATION DATE: NORMAL
DISPENSE STATUS: NORMAL
PRODUCT BLOOD TYPE: 5100
PRODUCT CODE: NORMAL
UNIT ABO: NORMAL
UNIT NUMBER: NORMAL
UNIT RH: NORMAL
UNIT VOLUME: 350
XM INTEP: NORMAL

## 2024-01-03 PROCEDURE — 2500000001 HC RX 250 WO HCPCS SELF ADMINISTERED DRUGS (ALT 637 FOR MEDICARE OP)

## 2024-01-03 RX ORDER — IBUPROFEN 600 MG/1
600 TABLET ORAL EVERY 6 HOURS PRN
Qty: 60 TABLET | Refills: 0 | Status: SHIPPED | OUTPATIENT
Start: 2024-01-03 | End: 2024-01-19 | Stop reason: ALTCHOICE

## 2024-01-03 RX ORDER — NORETHINDRONE 0.35 MG/1
1 TABLET ORAL DAILY
Qty: 28 TABLET | Refills: 2 | Status: SHIPPED | OUTPATIENT
Start: 2024-01-03 | End: 2024-01-19 | Stop reason: ALTCHOICE

## 2024-01-03 RX ORDER — ACETAMINOPHEN 325 MG/1
975 TABLET ORAL EVERY 6 HOURS PRN
Qty: 120 TABLET | Refills: 0 | Status: SHIPPED | OUTPATIENT
Start: 2024-01-03 | End: 2024-01-19 | Stop reason: ALTCHOICE

## 2024-01-03 RX ORDER — POLYETHYLENE GLYCOL 3350 17 G/17G
17 POWDER, FOR SOLUTION ORAL 2 TIMES DAILY PRN
Qty: 14 EACH | Refills: 0 | Status: SHIPPED | OUTPATIENT
Start: 2024-01-03 | End: 2024-01-19 | Stop reason: ALTCHOICE

## 2024-01-03 RX ADMIN — IBUPROFEN 600 MG: 600 TABLET, FILM COATED ORAL at 08:27

## 2024-01-03 RX ADMIN — ACETAMINOPHEN 975 MG: 325 TABLET ORAL at 08:27

## 2024-01-03 ASSESSMENT — PAIN SCALES - GENERAL
PAINLEVEL_OUTOF10: 8
PAINLEVEL_OUTOF10: 8

## 2024-01-03 ASSESSMENT — PAIN DESCRIPTION - DESCRIPTORS: DESCRIPTORS: SORE

## 2024-01-03 NOTE — CARE PLAN
Pt remains stable. VSS. Pain well controlled with PO pain meds, abdominal binder, and hot packs. Ambulating, passing gas, tolerating regular diet. Bonding well with infant. Fundus firm, midline, lochia moderate, incision KATHARINA, well approximated.

## 2024-01-03 NOTE — CARE PLAN
The patient's goals for the shift include get comfortable and be able to tolerate po nutrition    The clinical goals for the shift include Discharge    Patient's vss, assessment stable, pain well controlled, lochia light to scant. Patient clear for discharge

## 2024-01-03 NOTE — CARE PLAN
Patient is progressing through goals. Lochia is light, vital signs are stable, and pain is controlled.

## 2024-01-03 NOTE — DISCHARGE SUMMARY
Postpartum Discharge Summary    Admission Date: 2023  Discharge Date: 24     Discharge Diagnosis  Problem List Items Addressed This Visit       Rectal prolapse - Primary    Overview     - pt reports history of rectal prolapse with subsequent surgery in [place]  - reports doctor recommended not to attempt a vaginal birth         Relevant Orders    Case Request Operating Room: OBGYN Delivery  Section (Completed)    Spontaneous onset of labor after 37 but before 39 completed weeks gestation with delivery by planned  section, delivered with mention of postpartum complication    Relevant Orders    Case Request Operating Room: OBGYN Delivery  Section (Completed)    * (Principal)  delivery delivered    Relevant Medications    acetaminophen (Tylenol) 325 mg tablet    ibuprofen 600 mg tablet    polyethylene glycol (Glycolax, Miralax) 17 gram packet     Other Visit Diagnoses       Encounter for initial prescription of contraceptive pills        Relevant Medications    norethindrone (Micronor) 0.35 mg tablet             Criss Roman is a 28 y.o. female now  and postpartum day 3      Pregnancy notable for:  - h/o rectal prolapse surgery in [place], reports doctor recommended not to attempt a vaginal birth    Hospital Course  Admitted for PLTCS in s/o active labor and need for CS given h/o rectal prolapse surgery  Delivery Date: 2023  10:16 AM   Delivery type: , Low Transverse    GA at delivery: 37w5d  Outcome: Living   Anesthesia during delivery: Epidural   Intrapartum complications: Hemorrhage   Feeding method: Breastfeeding Status: No     Delivery complications: PPH - admission hgb 12.3 -> EBL 2 L -> 1u PRBC -> immediate post-op hgb 10.2 -> POD1 hgb 9.2  Contraception at discharge: POPs    Complications Requiring Follow-Up  none    Pertinent Subjective and Physical Exam At Time of Discharge  Patient seen at bedside - doing well and no acute events overnight. Pain  well-controlled on current regimen, lochia light, voiding spontaneously, passing flatus, ambulating independently, and tolerating PO.     General: well appearing, well-nourished, postpartum  Obstetric: abdomen soft/non-tender, fundus firm below umbilicus, lochia light, Pfannenstiel incision c/d/i  Skin: No rashes/lesions/erythema  Neuro: A/Ox3, conversational  GI: +BS, +flatus  Respiratory: Even and unlabored on RA, LSCTA BL  Cardiovascular: RRR, normal S1, S2  Extremities: No edema, discoloration, or pain in BLE, equal and palpable DP and PT pulses    Psych: appropriate mood and affect     Discharge Meds     Your medication list        START taking these medications        Instructions Last Dose Given Next Dose Due   acetaminophen 325 mg tablet  Commonly known as: Tylenol      Take 3 tablets (975 mg) by mouth every 6 hours if needed for mild pain (1 - 3).       ibuprofen 600 mg tablet      Take 1 tablet (600 mg) by mouth every 6 hours if needed for mild pain (1 - 3).       norethindrone 0.35 mg tablet  Commonly known as: Micronor      Take 1 tablet (0.35 mg) over 28 days by mouth once daily.       polyethylene glycol 17 gram packet  Commonly known as: Glycolax, Miralax      Take 17 g by mouth 2 times a day as needed (first line).              CONTINUE taking these medications        Instructions Last Dose Given Next Dose Due   ferrous sulfate (325 mg ferrous sulfate) tablet  Commonly known as: FerrouSuL      Take 1 tablet (65 mg of iron) by mouth once daily with a meal.       nystatin-triamcinolone ointment  Commonly known as: Mycolog II      Apply topically 2 times a day.       PNV no.163-iron-folate no.10 20 mg iron- 1 mg tablet                  STOP taking these medications      pyridoxine 25 mg tablet  Commonly known as: Vitamin B-6               ASK your doctor about these medications        Instructions Last Dose Given Next Dose Due   diphenhydrAMINE 25 mg tablet  Commonly known as: Sominex      Take 1 tablet  (25 mg) by mouth as needed at bedtime for sleep.                 Where to Get Your Medications        These medications were sent to [id number] - [place] [id number] [facility] [address]  [address] [address]      Phone: [phone]   acetaminophen 325 mg tablet  ibuprofen 600 mg tablet  norethindrone 0.35 mg tablet  polyethylene glycol 17 gram packet          Test Results Pending At Discharge  Pending Labs       No current pending labs.            Outpatient Follow-Up  No future appointments.   order placed to schedule 2 weeks for incision check and 4-6 weeks for routine postpartum visit  with [facility] providers.    Naty Lutz PA-C  01/03/24 4:04 PM  [place]

## 2024-01-04 ENCOUNTER — APPOINTMENT (OUTPATIENT)
Dept: OBSTETRICS AND GYNECOLOGY | Facility: CLINIC | Age: 29
End: 2024-01-04
Payer: MEDICAID

## 2024-01-04 NOTE — SIGNIFICANT EVENT
Follow-up Phone Call Note:   Interview:  Care Type: Women's Health   Phone Number Called: [phone]   Call Outcome: left a message   Date/Time Of Call: 1/4/24 @ 6556   Call Back Done By: care coordinator   Callback Complete:  Yes

## 2024-01-19 ENCOUNTER — POSTPARTUM VISIT (OUTPATIENT)
Dept: OBSTETRICS AND GYNECOLOGY | Facility: CLINIC | Age: 29
End: 2024-01-19
Payer: MEDICAID

## 2024-01-19 ENCOUNTER — PHARMACY VISIT (OUTPATIENT)
Dept: PHARMACY | Facility: CLINIC | Age: 29
End: 2024-01-19
Payer: MEDICAID

## 2024-01-19 VITALS
WEIGHT: 128.1 LBS | SYSTOLIC BLOOD PRESSURE: 111 MMHG | HEART RATE: 89 BPM | DIASTOLIC BLOOD PRESSURE: 69 MMHG | HEIGHT: 65 IN | BODY MASS INDEX: 21.34 KG/M2

## 2024-01-19 DIAGNOSIS — K62.3 RECTAL PROLAPSE: ICD-10-CM

## 2024-01-19 DIAGNOSIS — Z30.011 ENCOUNTER FOR INITIAL PRESCRIPTION OF CONTRACEPTIVE PILLS: ICD-10-CM

## 2024-01-19 DIAGNOSIS — Z87.59 HISTORY OF FOURTH DEGREE PERINEAL LACERATION: ICD-10-CM

## 2024-01-19 PROBLEM — O99.013 ANEMIA DURING PREGNANCY IN THIRD TRIMESTER (HHS-HCC): Status: RESOLVED | Noted: 2023-11-06 | Resolved: 2024-01-19

## 2024-01-19 PROCEDURE — RXMED WILLOW AMBULATORY MEDICATION CHARGE

## 2024-01-19 PROCEDURE — 99214 OFFICE O/P EST MOD 30 MIN: CPT | Performed by: OBSTETRICS & GYNECOLOGY

## 2024-01-19 ASSESSMENT — EDINBURGH POSTNATAL DEPRESSION SCALE (EPDS)
THE THOUGHT OF HARMING MYSELF HAS OCCURRED TO ME: NEVER
I HAVE FELT SAD OR MISERABLE: NO, NOT AT ALL
I HAVE FELT SCARED OR PANICKY FOR NO GOOD REASON: NO, NOT AT ALL
I HAVE LOOKED FORWARD WITH ENJOYMENT TO THINGS: AS MUCH AS I EVER DID
I HAVE BEEN ABLE TO LAUGH AND SEE THE FUNNY SIDE OF THINGS: AS MUCH AS I ALWAYS COULD
I HAVE BLAMED MYSELF UNNECESSARILY WHEN THINGS WENT WRONG: NO, NEVER
THINGS HAVE BEEN GETTING ON TOP OF ME: NO, MOST OF THE TIME I HAVE COPED QUITE WELL
I HAVE BEEN SO UNHAPPY THAT I HAVE HAD DIFFICULTY SLEEPING: NOT AT ALL
I HAVE BEEN ANXIOUS OR WORRIED FOR NO GOOD REASON: NO, NOT AT ALL
I HAVE BEEN SO UNHAPPY THAT I HAVE BEEN CRYING: NO, NEVER
TOTAL SCORE: 1

## 2024-01-19 ASSESSMENT — PAIN SCALES - GENERAL: PAINLEVEL: 1

## 2024-01-19 NOTE — PROGRESS NOTES
28 y.o.  presenting for postpartum follow-up     Delivery Date: 2023   GA at Delivery: 37 5/7 wks  Type of Delivery: , Low Transverse  due to history of rectal prolapse    Pregnancy Problems (from 25 to present)       Problem Noted Resolved    Anemia during pregnancy in third trimester 2023 by Yuki Chen MD No    Overview Signed 2023  4:21 PM by Yuki Chen MD     - : hgb 10.6, ferritin 18  - PO iron         Rectal prolapse 2023 by Yuki Chen MD No    Overview Signed 2023  2:41 PM by Yuki Chen MD     - pt reports history of rectal prolapse with subsequent surgery in [place]  - reports doctor recommended not to attempt a vaginal birth         36 weeks gestation of pregnancy 2023 by Yuki Chen MD 2024 by MILADIS Cintron-CNP    Overview Addendum 2023  1:17 PM by Jennifer Anaya MD     [x] datinwk US (not c/w LMP)  Baby boy Oluer  [x] Anatomic survey at 19-20w  [X] Third trimester labs (Syphilis, 1hr, CBC)  [ ] Immunizations: [ ] Flu [x] TDAP [ ] RSV [ ] Covid - declined flu/covid   [x] Contraception plan POPs  [x] feeding breast and bottle   [ ] 35w GBS - collected   [x] del plan - for pCS in s/o rectal prolapse with surgery, scheduled             Concerns: has mild pain at right side of incision  Pain: controlled      Lochia: none  Menses: No  Contraceptive Method: POP  Feeding Method: She is bottle feeding. With formula  Mood:   described as good    IMPRESSIONS:    Post partum state:  - Reviewed post partum expectations with patient. No concerns at this time.  - Contraception: using POPs, Rx for Slynd for improved coverage.  Desires to switch to patch.  Reported that still in window for increased risk of blood clots in legs/lungs.  Needs to be at least 21 days from delivery.    - Declined flu vax  - History of 4th degree with first delivery (requesting records to confirm history).  Referral to  pelvic floor PT for scar work and pelvic floor strengthening.   - postpartum precautions reviewed, including PP depression  - incisional hygiene reviewed    Preventive health  - last Pap 2023 @ [facility] --> due for repeat Pap 2026        Dispo: RTC for annual exam in 3-6 months.

## 2024-04-08 PROCEDURE — RXMED WILLOW AMBULATORY MEDICATION CHARGE

## 2024-04-12 ENCOUNTER — PHARMACY VISIT (OUTPATIENT)
Dept: PHARMACY | Facility: CLINIC | Age: 29
End: 2024-04-12
Payer: MEDICAID

## 2024-06-26 PROCEDURE — RXMED WILLOW AMBULATORY MEDICATION CHARGE

## 2024-07-05 ENCOUNTER — PHARMACY VISIT (OUTPATIENT)
Dept: PHARMACY | Facility: CLINIC | Age: 29
End: 2024-07-05
Payer: MEDICAID

## 2024-08-21 NOTE — ADDENDUM NOTE
Addended by: NAVEEN HARRISON on: 12/5/2023 08:19 AM     Modules accepted: Level of Service    
Statement Selected

## 2024-09-03 PROCEDURE — RXMED WILLOW AMBULATORY MEDICATION CHARGE

## 2024-09-18 ENCOUNTER — PHARMACY VISIT (OUTPATIENT)
Dept: PHARMACY | Facility: CLINIC | Age: 29
End: 2024-09-18
Payer: MEDICAID

## 2024-09-20 PROCEDURE — RXMED WILLOW AMBULATORY MEDICATION CHARGE

## 2024-10-09 ENCOUNTER — PHARMACY VISIT (OUTPATIENT)
Dept: PHARMACY | Facility: CLINIC | Age: 29
End: 2024-10-09
Payer: MEDICAID

## 2024-11-25 DIAGNOSIS — Z30.011 ENCOUNTER FOR INITIAL PRESCRIPTION OF CONTRACEPTIVE PILLS: ICD-10-CM

## 2024-12-03 PROCEDURE — RXMED WILLOW AMBULATORY MEDICATION CHARGE

## 2024-12-03 RX ORDER — DROSPIRENONE 4 MG/1
4 TABLET, FILM COATED ORAL DAILY
Qty: 84 TABLET | Refills: 4 | Status: SHIPPED | OUTPATIENT
Start: 2024-12-03

## 2024-12-04 ENCOUNTER — PHARMACY VISIT (OUTPATIENT)
Dept: PHARMACY | Facility: CLINIC | Age: 29
End: 2024-12-04
Payer: MEDICAID

## 2025-01-13 ENCOUNTER — APPOINTMENT (OUTPATIENT)
Dept: OBSTETRICS AND GYNECOLOGY | Facility: CLINIC | Age: 30
End: 2025-01-13
Payer: MEDICAID

## 2025-01-20 ENCOUNTER — OFFICE VISIT (OUTPATIENT)
Dept: OBSTETRICS AND GYNECOLOGY | Facility: CLINIC | Age: 30
End: 2025-01-20
Payer: MEDICAID

## 2025-01-20 ENCOUNTER — PHARMACY VISIT (OUTPATIENT)
Dept: PHARMACY | Facility: CLINIC | Age: 30
End: 2025-01-20
Payer: MEDICAID

## 2025-01-20 VITALS
BODY MASS INDEX: 23.21 KG/M2 | HEIGHT: 63 IN | SYSTOLIC BLOOD PRESSURE: 115 MMHG | HEART RATE: 102 BPM | DIASTOLIC BLOOD PRESSURE: 69 MMHG | WEIGHT: 131 LBS

## 2025-01-20 DIAGNOSIS — Z30.41 ENCOUNTER FOR SURVEILLANCE OF CONTRACEPTIVE PILLS: ICD-10-CM

## 2025-01-20 DIAGNOSIS — Z01.419 WOMEN'S ANNUAL ROUTINE GYNECOLOGICAL EXAMINATION: Primary | ICD-10-CM

## 2025-01-20 PROBLEM — K62.3 RECTAL PROLAPSE: Status: RESOLVED | Noted: 2023-11-06 | Resolved: 2025-01-20

## 2025-01-20 PROCEDURE — RXMED WILLOW AMBULATORY MEDICATION CHARGE

## 2025-01-20 PROCEDURE — 99395 PREV VISIT EST AGE 18-39: CPT | Performed by: OBSTETRICS & GYNECOLOGY

## 2025-01-20 PROCEDURE — 3008F BODY MASS INDEX DOCD: CPT | Performed by: OBSTETRICS & GYNECOLOGY

## 2025-01-20 RX ORDER — DROSPIRENONE AND ETHINYL ESTRADIOL 0.02-3(28)
1 KIT ORAL DAILY
Qty: 84 TABLET | Refills: 3 | Status: SHIPPED | OUTPATIENT
Start: 2025-01-20 | End: 2026-01-20

## 2025-01-20 ASSESSMENT — EDINBURGH POSTNATAL DEPRESSION SCALE (EPDS)
THE THOUGHT OF HARMING MYSELF HAS OCCURRED TO ME: NEVER
I HAVE BEEN ABLE TO LAUGH AND SEE THE FUNNY SIDE OF THINGS: AS MUCH AS I ALWAYS COULD
I HAVE LOOKED FORWARD WITH ENJOYMENT TO THINGS: AS MUCH AS I EVER DID
I HAVE BLAMED MYSELF UNNECESSARILY WHEN THINGS WENT WRONG: NO, NEVER
I HAVE BEEN ANXIOUS OR WORRIED FOR NO GOOD REASON: NO, NOT AT ALL
I HAVE BEEN SO UNHAPPY THAT I HAVE BEEN CRYING: NO, NEVER
TOTAL SCORE: 0
I HAVE BEEN SO UNHAPPY THAT I HAVE HAD DIFFICULTY SLEEPING: NOT AT ALL
THINGS HAVE BEEN GETTING ON TOP OF ME: NO, I HAVE BEEN COPING AS WELL AS EVER
I HAVE FELT SCARED OR PANICKY FOR NO GOOD REASON: NO, NOT AT ALL
I HAVE FELT SAD OR MISERABLE: NO, NOT AT ALL

## 2025-01-20 ASSESSMENT — ENCOUNTER SYMPTOMS
EYES NEGATIVE: 0
CARDIOVASCULAR NEGATIVE: 0
GASTROINTESTINAL NEGATIVE: 0
ENDOCRINE NEGATIVE: 0
PSYCHIATRIC NEGATIVE: 0
LOSS OF SENSATION IN FEET: 0
HEMATOLOGIC/LYMPHATIC NEGATIVE: 0
OCCASIONAL FEELINGS OF UNSTEADINESS: 0
ALLERGIC/IMMUNOLOGIC NEGATIVE: 0
RESPIRATORY NEGATIVE: 0
NEUROLOGICAL NEGATIVE: 0
MUSCULOSKELETAL NEGATIVE: 0
CONSTITUTIONAL NEGATIVE: 0
DEPRESSION: 0

## 2025-01-20 ASSESSMENT — COLUMBIA-SUICIDE SEVERITY RATING SCALE - C-SSRS: 1. IN THE PAST MONTH, HAVE YOU WISHED YOU WERE DEAD OR WISHED YOU COULD GO TO SLEEP AND NOT WAKE UP?: NO

## 2025-01-20 ASSESSMENT — PAIN SCALES - GENERAL: PAINLEVEL_OUTOF10: 0-NO PAIN

## 2025-01-20 ASSESSMENT — PATIENT HEALTH QUESTIONNAIRE - PHQ9
1. LITTLE INTEREST OR PLEASURE IN DOING THINGS: NOT AT ALL
2. FEELING DOWN, DEPRESSED OR HOPELESS: NOT AT ALL
SUM OF ALL RESPONSES TO PHQ9 QUESTIONS 1 AND 2: 0

## 2025-01-20 NOTE — PROGRESS NOTES
Criss Roman 29 y.o. y/o who presents for annual gyn exam.      Preventive health  Cervical cancer screening:    HPV vaccine {HPVvax:74628}  Breast cancer screening ***  Colon cancer screening ***  STI screening today, patient request  Lipids, TSH, CBC {YES wildcard/NO:60}    Reproductive Life Planning      -------------------------------------  HPI    Has been taking slynd ever since 1/2024, after birth of baby on 12/31/23  1/3 - 1/18 was having bleeding    Gynecologic History:    Menarche: ***  Menses: ***  Last Pap: ***  History of Dysplasia: ***  Sexually active: ***  Contraception: ***    History reviewed and updated in patient's History Tab        Vitals:    01/20/25 1134   BP: 115/69   Pulse: 102       OBGyn Exam

## 2025-03-27 PROCEDURE — RXMED WILLOW AMBULATORY MEDICATION CHARGE

## 2025-03-28 ENCOUNTER — PHARMACY VISIT (OUTPATIENT)
Dept: PHARMACY | Facility: CLINIC | Age: 30
End: 2025-03-28
Payer: MEDICAID

## 2025-06-02 PROCEDURE — RXMED WILLOW AMBULATORY MEDICATION CHARGE

## 2025-06-09 ENCOUNTER — PHARMACY VISIT (OUTPATIENT)
Dept: PHARMACY | Facility: CLINIC | Age: 30
End: 2025-06-09
Payer: MEDICAID

## 2025-06-20 ENCOUNTER — APPOINTMENT (OUTPATIENT)
Dept: OBSTETRICS AND GYNECOLOGY | Facility: CLINIC | Age: 30
End: 2025-06-20
Payer: MEDICAID

## 2025-08-31 PROCEDURE — RXMED WILLOW AMBULATORY MEDICATION CHARGE

## 2025-09-04 ENCOUNTER — PHARMACY VISIT (OUTPATIENT)
Dept: PHARMACY | Facility: CLINIC | Age: 30
End: 2025-09-04
Payer: MEDICAID

## (undated) DEVICE — SUTURE, PDS II, 0, 60 IN, CTX, VIOLET

## (undated) DEVICE — DRESSING, ABDOMINAL, WET PRUF, TENDERSORB, 5 X 9 IN, STERILE

## (undated) DEVICE — SUTURE, MONOCRYL, 4-0, 27 IN, PS-2, UNDYED

## (undated) DEVICE — DRESSING, NON-ADHERENT, TELFA, OUCHLESS, 3 X 8 IN, STERILE

## (undated) DEVICE — SUTURE, PDS II, 2-0, 27 IN, CT-1 VIL MONO, LF

## (undated) DEVICE — DRESSING, ADHESIVE, ISLAND, TELFA, 4 X 10 IN

## (undated) DEVICE — DRAPE PACK, CESAREAN SECTION, CUSTOM, UHC